# Patient Record
Sex: MALE | Race: WHITE | NOT HISPANIC OR LATINO
[De-identification: names, ages, dates, MRNs, and addresses within clinical notes are randomized per-mention and may not be internally consistent; named-entity substitution may affect disease eponyms.]

---

## 2017-03-11 ENCOUNTER — APPOINTMENT (OUTPATIENT)
Dept: RADIOLOGY | Facility: HOSPITAL | Age: 52
End: 2017-03-11

## 2017-03-11 ENCOUNTER — OUTPATIENT (OUTPATIENT)
Dept: OUTPATIENT SERVICES | Facility: HOSPITAL | Age: 52
LOS: 1 days | End: 2017-03-11
Payer: COMMERCIAL

## 2017-03-11 PROBLEM — Z00.00 ENCOUNTER FOR PREVENTIVE HEALTH EXAMINATION: Status: ACTIVE | Noted: 2017-03-11

## 2017-03-11 PROCEDURE — 72100 X-RAY EXAM L-S SPINE 2/3 VWS: CPT

## 2017-03-11 PROCEDURE — 72170 X-RAY EXAM OF PELVIS: CPT | Mod: 26

## 2017-03-11 PROCEDURE — 72170 X-RAY EXAM OF PELVIS: CPT

## 2017-03-11 PROCEDURE — 72100 X-RAY EXAM L-S SPINE 2/3 VWS: CPT | Mod: 26

## 2024-04-29 ENCOUNTER — INPATIENT (INPATIENT)
Facility: HOSPITAL | Age: 59
LOS: 4 days | Discharge: HOME | DRG: 93 | End: 2024-05-04
Attending: STUDENT IN AN ORGANIZED HEALTH CARE EDUCATION/TRAINING PROGRAM | Admitting: FAMILY MEDICINE
Payer: COMMERCIAL

## 2024-04-29 VITALS — HEIGHT: 71 IN | WEIGHT: 315 LBS

## 2024-04-29 DIAGNOSIS — R29.810 FACIAL WEAKNESS: ICD-10-CM

## 2024-04-29 LAB
ADD ON TEST-SPECIMEN IN LAB: SIGNIFICANT CHANGE UP
ALBUMIN SERPL ELPH-MCNC: 3.9 G/DL — SIGNIFICANT CHANGE UP (ref 3.3–5)
ALP SERPL-CCNC: 94 U/L — SIGNIFICANT CHANGE UP (ref 40–120)
ALT FLD-CCNC: 35 U/L — SIGNIFICANT CHANGE UP (ref 12–78)
ANION GAP SERPL CALC-SCNC: 4 MMOL/L — LOW (ref 5–17)
APTT BLD: 34.4 SEC — SIGNIFICANT CHANGE UP (ref 24.5–35.6)
AST SERPL-CCNC: 34 U/L — SIGNIFICANT CHANGE UP (ref 15–37)
BASOPHILS # BLD AUTO: 0.07 K/UL — SIGNIFICANT CHANGE UP (ref 0–0.2)
BASOPHILS NFR BLD AUTO: 0.9 % — SIGNIFICANT CHANGE UP (ref 0–2)
BILIRUB SERPL-MCNC: 0.6 MG/DL — SIGNIFICANT CHANGE UP (ref 0.2–1.2)
BUN SERPL-MCNC: 19 MG/DL — SIGNIFICANT CHANGE UP (ref 7–23)
CALCIUM SERPL-MCNC: 9.3 MG/DL — SIGNIFICANT CHANGE UP (ref 8.5–10.1)
CHLORIDE SERPL-SCNC: 106 MMOL/L — SIGNIFICANT CHANGE UP (ref 96–108)
CO2 SERPL-SCNC: 26 MMOL/L — SIGNIFICANT CHANGE UP (ref 22–31)
CREAT SERPL-MCNC: 1.17 MG/DL — SIGNIFICANT CHANGE UP (ref 0.5–1.3)
EGFR: 71 ML/MIN/1.73M2 — SIGNIFICANT CHANGE UP
EOSINOPHIL # BLD AUTO: 0.12 K/UL — SIGNIFICANT CHANGE UP (ref 0–0.5)
EOSINOPHIL NFR BLD AUTO: 1.6 % — SIGNIFICANT CHANGE UP (ref 0–6)
GLUCOSE SERPL-MCNC: 109 MG/DL — HIGH (ref 70–99)
HCT VFR BLD CALC: 43.8 % — SIGNIFICANT CHANGE UP (ref 39–50)
HGB BLD-MCNC: 14.1 G/DL — SIGNIFICANT CHANGE UP (ref 13–17)
IMM GRANULOCYTES NFR BLD AUTO: 0.3 % — SIGNIFICANT CHANGE UP (ref 0–0.9)
INR BLD: 0.99 RATIO — SIGNIFICANT CHANGE UP (ref 0.85–1.18)
LYMPHOCYTES # BLD AUTO: 1.07 K/UL — SIGNIFICANT CHANGE UP (ref 1–3.3)
LYMPHOCYTES # BLD AUTO: 14.2 % — SIGNIFICANT CHANGE UP (ref 13–44)
MCHC RBC-ENTMCNC: 28 PG — SIGNIFICANT CHANGE UP (ref 27–34)
MCHC RBC-ENTMCNC: 32.2 GM/DL — SIGNIFICANT CHANGE UP (ref 32–36)
MCV RBC AUTO: 86.9 FL — SIGNIFICANT CHANGE UP (ref 80–100)
MONOCYTES # BLD AUTO: 0.65 K/UL — SIGNIFICANT CHANGE UP (ref 0–0.9)
MONOCYTES NFR BLD AUTO: 8.7 % — SIGNIFICANT CHANGE UP (ref 2–14)
NEUTROPHILS # BLD AUTO: 5.58 K/UL — SIGNIFICANT CHANGE UP (ref 1.8–7.4)
NEUTROPHILS NFR BLD AUTO: 74.3 % — SIGNIFICANT CHANGE UP (ref 43–77)
PLATELET # BLD AUTO: 188 K/UL — SIGNIFICANT CHANGE UP (ref 150–400)
POTASSIUM SERPL-MCNC: 3.9 MMOL/L — SIGNIFICANT CHANGE UP (ref 3.5–5.3)
POTASSIUM SERPL-SCNC: 3.9 MMOL/L — SIGNIFICANT CHANGE UP (ref 3.5–5.3)
PROT SERPL-MCNC: 7.8 GM/DL — SIGNIFICANT CHANGE UP (ref 6–8.3)
PROTHROM AB SERPL-ACNC: 11.2 SEC — SIGNIFICANT CHANGE UP (ref 9.5–13)
RBC # BLD: 5.04 M/UL — SIGNIFICANT CHANGE UP (ref 4.2–5.8)
RBC # FLD: 14.6 % — HIGH (ref 10.3–14.5)
SODIUM SERPL-SCNC: 136 MMOL/L — SIGNIFICANT CHANGE UP (ref 135–145)
TROPONIN I, HIGH SENSITIVITY RESULT: 27.96 NG/L — SIGNIFICANT CHANGE UP
WBC # BLD: 7.51 K/UL — SIGNIFICANT CHANGE UP (ref 3.8–10.5)
WBC # FLD AUTO: 7.51 K/UL — SIGNIFICANT CHANGE UP (ref 3.8–10.5)

## 2024-04-29 PROCEDURE — 80307 DRUG TEST PRSMV CHEM ANLYZR: CPT

## 2024-04-29 PROCEDURE — 85025 COMPLETE CBC W/AUTO DIFF WBC: CPT

## 2024-04-29 PROCEDURE — 99285 EMERGENCY DEPT VISIT HI MDM: CPT

## 2024-04-29 PROCEDURE — 80053 COMPREHEN METABOLIC PANEL: CPT

## 2024-04-29 PROCEDURE — 99223 1ST HOSP IP/OBS HIGH 75: CPT

## 2024-04-29 PROCEDURE — 70551 MRI BRAIN STEM W/O DYE: CPT | Mod: MC

## 2024-04-29 PROCEDURE — 82962 GLUCOSE BLOOD TEST: CPT

## 2024-04-29 PROCEDURE — 84484 ASSAY OF TROPONIN QUANT: CPT

## 2024-04-29 PROCEDURE — 86618 LYME DISEASE ANTIBODY: CPT

## 2024-04-29 PROCEDURE — 70450 CT HEAD/BRAIN W/O DYE: CPT | Mod: 26,MC

## 2024-04-29 PROCEDURE — 80061 LIPID PANEL: CPT

## 2024-04-29 PROCEDURE — 87468 ANAPLSMA PHGCYTOPHLM AMP PRB: CPT

## 2024-04-29 PROCEDURE — 97162 PT EVAL MOD COMPLEX 30 MIN: CPT | Mod: GP

## 2024-04-29 PROCEDURE — 70498 CT ANGIOGRAPHY NECK: CPT | Mod: 26,MC

## 2024-04-29 PROCEDURE — 85730 THROMBOPLASTIN TIME PARTIAL: CPT

## 2024-04-29 PROCEDURE — 70496 CT ANGIOGRAPHY HEAD: CPT | Mod: 26,MC

## 2024-04-29 PROCEDURE — 83036 HEMOGLOBIN GLYCOSYLATED A1C: CPT

## 2024-04-29 PROCEDURE — 87484 EHRLICHA CHAFFEENSIS AMP PRB: CPT

## 2024-04-29 PROCEDURE — 92610 EVALUATE SWALLOWING FUNCTION: CPT | Mod: GN

## 2024-04-29 PROCEDURE — 71046 X-RAY EXAM CHEST 2 VIEWS: CPT | Mod: 26

## 2024-04-29 PROCEDURE — 85610 PROTHROMBIN TIME: CPT

## 2024-04-29 PROCEDURE — 92523 SPEECH SOUND LANG COMPREHEN: CPT | Mod: GN

## 2024-04-29 PROCEDURE — 97116 GAIT TRAINING THERAPY: CPT | Mod: GP

## 2024-04-29 PROCEDURE — 87798 DETECT AGENT NOS DNA AMP: CPT

## 2024-04-29 PROCEDURE — 93306 TTE W/DOPPLER COMPLETE: CPT

## 2024-04-29 PROCEDURE — 36415 COLL VENOUS BLD VENIPUNCTURE: CPT

## 2024-04-29 RX ORDER — LABETALOL HCL 100 MG
10 TABLET ORAL ONCE
Refills: 0 | Status: COMPLETED | OUTPATIENT
Start: 2024-04-29 | End: 2024-04-29

## 2024-04-29 RX ORDER — HYDRALAZINE HCL 50 MG
5 TABLET ORAL ONCE
Refills: 0 | Status: COMPLETED | OUTPATIENT
Start: 2024-04-29 | End: 2024-04-29

## 2024-04-29 RX ORDER — AMLODIPINE BESYLATE 2.5 MG/1
5 TABLET ORAL DAILY
Refills: 0 | Status: DISCONTINUED | OUTPATIENT
Start: 2024-04-30 | End: 2024-04-30

## 2024-04-29 RX ORDER — ASCORBIC ACID 60 MG
1 TABLET,CHEWABLE ORAL
Refills: 0 | DISCHARGE

## 2024-04-29 RX ORDER — OMEGA-3 ACID ETHYL ESTERS 1 G
1 CAPSULE ORAL
Refills: 0 | DISCHARGE

## 2024-04-29 RX ORDER — MILK THISTLE 150 MG
1 CAPSULE ORAL
Refills: 0 | DISCHARGE

## 2024-04-29 RX ORDER — HYDRALAZINE HCL 50 MG
5 TABLET ORAL ONCE
Refills: 0 | Status: DISCONTINUED | OUTPATIENT
Start: 2024-04-29 | End: 2024-04-29

## 2024-04-29 RX ORDER — ACETAMINOPHEN 500 MG
650 TABLET ORAL EVERY 6 HOURS
Refills: 0 | Status: DISCONTINUED | OUTPATIENT
Start: 2024-04-29 | End: 2024-05-04

## 2024-04-29 RX ORDER — ASCORBIC ACID 60 MG
500 TABLET,CHEWABLE ORAL DAILY
Refills: 0 | Status: DISCONTINUED | OUTPATIENT
Start: 2024-04-29 | End: 2024-05-04

## 2024-04-29 RX ORDER — ONDANSETRON 8 MG/1
4 TABLET, FILM COATED ORAL EVERY 6 HOURS
Refills: 0 | Status: DISCONTINUED | OUTPATIENT
Start: 2024-04-29 | End: 2024-04-29

## 2024-04-29 RX ORDER — ASPIRIN/CALCIUM CARB/MAGNESIUM 324 MG
325 TABLET ORAL ONCE
Refills: 0 | Status: COMPLETED | OUTPATIENT
Start: 2024-04-29 | End: 2024-04-29

## 2024-04-29 RX ORDER — OMEGA-3 ACID ETHYL ESTERS 1 G
2 CAPSULE ORAL DAILY
Refills: 0 | Status: DISCONTINUED | OUTPATIENT
Start: 2024-04-29 | End: 2024-05-04

## 2024-04-29 RX ORDER — MORPHINE SULFATE 50 MG/1
2 CAPSULE, EXTENDED RELEASE ORAL EVERY 4 HOURS
Refills: 0 | Status: DISCONTINUED | OUTPATIENT
Start: 2024-04-29 | End: 2024-04-29

## 2024-04-29 RX ADMIN — Medication 325 MILLIGRAM(S): at 15:59

## 2024-04-29 RX ADMIN — Medication 20 MILLIGRAM(S): at 21:34

## 2024-04-29 RX ADMIN — Medication 10 MILLIGRAM(S): at 19:08

## 2024-04-29 RX ADMIN — Medication 10 MILLIGRAM(S): at 20:49

## 2024-04-29 RX ADMIN — Medication 40 MILLIGRAM(S): at 15:57

## 2024-04-29 NOTE — ED ADULT TRIAGE NOTE - CHIEF COMPLAINT QUOTE
Pt coming into the ED with c/o facial droop starting at 05:30. Pt denies weakness and LOB. MD Abbott evaluated pt, no code stroke called, pt sent directly to CT. . Pt has no other complaints at this time.

## 2024-04-29 NOTE — ED ADULT NURSE NOTE - OBJECTIVE STATEMENT
pt presenting w/right sided facial droop since he woke this morning around 530 am. pt states his eye is also swollen. evaled by MD, pnd CT read, lined and labbed. NIKKI w/o issue

## 2024-04-29 NOTE — ED PROVIDER NOTE - CLINICAL SUMMARY MEDICAL DECISION MAKING FREE TEXT BOX
Patient is a 58-year-old male who reports he awoke with inability to close right eye inability to elevate right eyebrow and right-sided facial droop.  Patient denies any recent illness.  Patient denies any difficulty with speech weakness in arms and legs.  Fever diagnosis of Bell's palsy on examination.  Due to patient's age will order CT noncontrast head and basic labs including Lyme and reeval closely

## 2024-04-29 NOTE — PHARMACOTHERAPY INTERVENTION NOTE - COMMENTS
Medication reconciliation completed.  Reviewed Medication list and confirmed med allergies with patient; confirmed with Dr. First Medlizett.

## 2024-04-29 NOTE — ED ADULT NURSE REASSESSMENT NOTE - NS ED NURSE REASSESS COMMENT FT1
Received report from BORIS PHILLIPS. Pt A&Ox4, resting comfortably. Pt denies any pain or discomfort. VS as charted, respirations equal and unlabored. Pending bed placement. Pt updated on plan of care, verbalized understanding. Safety measures maintained, stretcher locked and in lowest position, both side rails up.

## 2024-04-29 NOTE — ED PROVIDER NOTE - PROGRESS NOTE DETAILS
Mykel Tenorio for Dr. Abbott: Clinical exam suggestive of Bell's palsy, although patient with bifascicular block with no old EKG and uncontrolled HTN at this time and obesity. Will admit for further cardiac and neuro workup at this time. I, TONNY Richmond personally discussed the case with consultant, Dr. Piper attending hospitalist, about pt who has facial droop and old infarcts on CT head and will need hospital admission."

## 2024-04-29 NOTE — ED PROVIDER NOTE - OBJECTIVE STATEMENT
Patient is a 58-year-old male with no past medical history.  Patient reports that he was just at doctor for routine physical.  patient reports that he went to sleep between 10 and 11:00 last night.  Patient reports that he awoke between 6 and 7 AM this morning.   Patient denies any difficulties with speech.  Patient denies any weakness in arms or legs.  Patient reports that he is unable to close right eye fully also with droop to right side of his lips.  Patient came to the hospital for evaluation.  Patient denies any history of hypertension, diabetes, heart disease, stroke.

## 2024-04-29 NOTE — ED PROVIDER NOTE - NEUROLOGICAL, MLM
Alert and oriented, unable to close right eye fully; unable to wrinkle right side of forehead (asymmetry) and right facial droop

## 2024-04-29 NOTE — H&P ADULT - SKIN
warm and dry/color normal/normal/no rashes/no ulcers Detail Level: Detailed Quality 110: Preventive Care And Screening: Influenza Immunization: Influenza Immunization previously received during influenza season

## 2024-04-29 NOTE — H&P ADULT - ASSESSMENT
59 y/o M w/ no significant PMH, p/w R facial droop    *R facial droop - Suspect [place] Palsy  -Clinical findings more indicative of [place] palsy, rather than CVA. However, will consult Neuro and will defer to neuro regarding work up for CVA  -CT:  -MRI   -Will start prednisone for possible bells palsy  -Lyme panel   -ID consult to evaluate for recommendation to possbily add Antiviral as well   -Artifical eye drops     *Bifasciular block on EKG  -Old EKG not available for comparison  -Cardio consult     *DVT ppx   -SCDs     >75 mins required for admission  59 y/o M w/ no significant PMH, p/w R facial droop    *R facial droop - Suspect [place] Palsy  -Clinical findings more indicative of [place] palsy, rather than CVA. However, will consult Neuro and will defer to neuro regarding work up for CVA  -CT: No acute findings   -MRI   -Will start prednisone for possible bells palsy  -Lyme panel   -ID consult to evaluate for recommendation to possbily add Antiviral as well   -Artifical eye drops     *Bifasciular block on EKG  -Old EKG not available for comparison  -Cardio consult     *DVT ppx   -SCDs     >75 mins required for admission    59 y/o M w/ no significant PMH, p/w R facial droop    *R facial droop - Suspect [place] Palsy  -Clinical findings more indicative of [place] palsy, rather than CVA. However, will consult Neuro and will defer to neuro regarding work up for CVA  -CTH: No acute findings  -MRI   -Will start prednisone for possible bells palsy  -Lyme panel   -ID consult to evaluate for recommendation to possbily add Antiviral as well   -Artifical eye drops     *Bifasciular block on EKG  -Old EKG not available for comparison  -Cardio consult     *DVT ppx   -SCDs     >75 mins required for admission  59 y/o M w/ no significant PMH, p/w R facial droop    *R facial droop - Suspect [place] Palsy  -Clinical findings more indicative of [place] palsy, rather than CVA. However, will consult Neuro and will defer to neuro regarding work up for CVA  -CTH: No acute findings  -MRI   -Will start prednisone for possible bells palsy  -Lyme panel   -ID consult to evaluate for recommendation to possibly add Antiviral as well   -Artifical eye drops     *Bifasciular block on EKG  -Old EKG not available for comparison  -Cardio consult     *DVT ppx   -SCDs     >75 mins required for admission    57 y/o M w/ no significant PMH, p/w R facial droop    *R facial droop - Suspect [place] Palsy  -Clinical findings more indicative of [place] palsy, rather than CVA. However, will consult Neuro and will defer to neuro regarding work up for CVA  -CTH: No acute findings  -MRI   -Will start prednisone for possible bells palsy  -Lyme panel   -ID consult to evaluate for recommendation to possibly add Antiviral as well   -Artifical eye drops     *Bifasciular block on EKG  -Old EKG not available for comparison  -Cardio consult     *DVT ppx   -SCDs     >75 mins required for admission    57 y/o M w/ no significant PMH, p/w R facial droop    *R facial droop - Suspect [place] Palsy  -Clinical findings more indicative of [place] palsy, rather than CVA. However, will consult Neuro and will defer to neuro regarding work up for CVA  -CTH: No acute findings  -MRI   -Will start prednisone for possible bells palsy  -Lyme panel   -ID consult to evaluate for recommendation to possibly add Antiviral as well   -Artifical eye drops     *Bifasciular block on EKG  -Old EKG not available for comparison  -Cardio consult     *Hypertensive urgency  -S/p labetaolol in ED  -Will give Hydralazine IV stat  -Will start norvasc 5mg PO daily and adjust as needed   -Lifestyle modifications    *DVT ppx   -SCDs     >75 mins required for admission

## 2024-04-29 NOTE — ED ADULT NURSE REASSESSMENT NOTE - NS ED NURSE REASSESS COMMENT FT1
Pt with Dr. Queen regarding pt's BP. Will medicate as per orders in MAR. Dr. Queen stated pt is ok to go to floor as of now.

## 2024-04-29 NOTE — H&P ADULT - HISTORY OF PRESENT ILLNESS
57 y/o M w/ no significant PMH, p/w R facial droop. Patient states he woke up around 5:30am and went to drink water when he realized that he had R sided facial droop. Patient states he had COVID approximately 6 weeks ago. Denies any other recent infection, rash, vesicles in general areas or cold sores. Denies weakness in arms / legs. Denies CP, SOB, cough, runny nose, sore throat, nausea, vomiting, abdominal pain, fever, chills       PSH: Denies     Social Hx: Former smoker - quit in 2022,  etoh - 1-2 drinks on weekend, drugs - denies      Family Hx: Mother - HTN, father - esophageal cancer

## 2024-04-29 NOTE — ED PROVIDER NOTE - ATTENDING APP SHARED VISIT CONTRIBUTION OF CARE
I, Danyelle Abbott DO,  performed the initial face to face bedside interview with this patient regarding history of present illness, review of symptoms and relevant past medical, social and family history.  I completed an independent physical examination.  I was the initial provider who evaluated this patient.   I personally saw the patient and performed a substantive portion of the visit including all aspects of the medical decision making.  I have signed out the follow up of any pending tests (i.e. labs, radiological studies) to the ACP.  I have communicated the patient’s plan of care and disposition with the ACP.  The history, relevant review of systems, past medical and surgical history, medical decision making, and physical examination was documented by the scribe in my presence and I attest to the accuracy of the documentation.

## 2024-04-30 ENCOUNTER — RESULT REVIEW (OUTPATIENT)
Age: 59
End: 2024-04-30

## 2024-04-30 DIAGNOSIS — I10 ESSENTIAL (PRIMARY) HYPERTENSION: ICD-10-CM

## 2024-04-30 DIAGNOSIS — R94.31 ABNORMAL ELECTROCARDIOGRAM [ECG] [EKG]: ICD-10-CM

## 2024-04-30 DIAGNOSIS — E78.5 HYPERLIPIDEMIA, UNSPECIFIED: ICD-10-CM

## 2024-04-30 DIAGNOSIS — E66.01 MORBID (SEVERE) OBESITY DUE TO EXCESS CALORIES: ICD-10-CM

## 2024-04-30 LAB
A1C WITH ESTIMATED AVERAGE GLUCOSE RESULT: 6.1 % — HIGH (ref 4–5.6)
ALBUMIN SERPL ELPH-MCNC: 3.6 G/DL — SIGNIFICANT CHANGE UP (ref 3.3–5)
ALP SERPL-CCNC: 82 U/L — SIGNIFICANT CHANGE UP (ref 40–120)
ALT FLD-CCNC: 32 U/L — SIGNIFICANT CHANGE UP (ref 12–78)
AMPHET UR-MCNC: NEGATIVE — SIGNIFICANT CHANGE UP
ANION GAP SERPL CALC-SCNC: 4 MMOL/L — LOW (ref 5–17)
APTT BLD: 29.9 SEC — SIGNIFICANT CHANGE UP (ref 24.5–35.6)
AST SERPL-CCNC: 23 U/L — SIGNIFICANT CHANGE UP (ref 15–37)
B BURGDOR C6 AB SER-ACNC: NEGATIVE — SIGNIFICANT CHANGE UP
B BURGDOR IGG+IGM SER-ACNC: 0.1 INDEX — SIGNIFICANT CHANGE UP (ref 0.01–0.89)
BARBITURATES UR SCN-MCNC: NEGATIVE — SIGNIFICANT CHANGE UP
BASOPHILS # BLD AUTO: 0.02 K/UL — SIGNIFICANT CHANGE UP (ref 0–0.2)
BASOPHILS NFR BLD AUTO: 0.2 % — SIGNIFICANT CHANGE UP (ref 0–2)
BENZODIAZ UR-MCNC: NEGATIVE — SIGNIFICANT CHANGE UP
BILIRUB SERPL-MCNC: 0.4 MG/DL — SIGNIFICANT CHANGE UP (ref 0.2–1.2)
BUN SERPL-MCNC: 16 MG/DL — SIGNIFICANT CHANGE UP (ref 7–23)
CALCIUM SERPL-MCNC: 9.4 MG/DL — SIGNIFICANT CHANGE UP (ref 8.5–10.1)
CHLORIDE SERPL-SCNC: 108 MMOL/L — SIGNIFICANT CHANGE UP (ref 96–108)
CHOLEST SERPL-MCNC: 178 MG/DL — SIGNIFICANT CHANGE UP
CO2 SERPL-SCNC: 24 MMOL/L — SIGNIFICANT CHANGE UP (ref 22–31)
COCAINE METAB.OTHER UR-MCNC: NEGATIVE — SIGNIFICANT CHANGE UP
CREAT SERPL-MCNC: 1.25 MG/DL — SIGNIFICANT CHANGE UP (ref 0.5–1.3)
EGFR: 66 ML/MIN/1.73M2 — SIGNIFICANT CHANGE UP
EOSINOPHIL # BLD AUTO: 0 K/UL — SIGNIFICANT CHANGE UP (ref 0–0.5)
EOSINOPHIL NFR BLD AUTO: 0 % — SIGNIFICANT CHANGE UP (ref 0–6)
ESTIMATED AVERAGE GLUCOSE: 128 MG/DL — HIGH (ref 68–114)
GLUCOSE BLDC GLUCOMTR-MCNC: 100 MG/DL — HIGH (ref 70–99)
GLUCOSE SERPL-MCNC: 152 MG/DL — HIGH (ref 70–99)
HCT VFR BLD CALC: 42.2 % — SIGNIFICANT CHANGE UP (ref 39–50)
HDLC SERPL-MCNC: 41 MG/DL — SIGNIFICANT CHANGE UP
HGB BLD-MCNC: 13.7 G/DL — SIGNIFICANT CHANGE UP (ref 13–17)
IMM GRANULOCYTES NFR BLD AUTO: 0.4 % — SIGNIFICANT CHANGE UP (ref 0–0.9)
INR BLD: 1.01 RATIO — SIGNIFICANT CHANGE UP (ref 0.85–1.18)
LIPID PNL WITH DIRECT LDL SERPL: 129 MG/DL — HIGH
LYMPHOCYTES # BLD AUTO: 0.91 K/UL — LOW (ref 1–3.3)
LYMPHOCYTES # BLD AUTO: 9.8 % — LOW (ref 13–44)
MCHC RBC-ENTMCNC: 27.8 PG — SIGNIFICANT CHANGE UP (ref 27–34)
MCHC RBC-ENTMCNC: 32.5 GM/DL — SIGNIFICANT CHANGE UP (ref 32–36)
MCV RBC AUTO: 85.8 FL — SIGNIFICANT CHANGE UP (ref 80–100)
METHADONE UR-MCNC: NEGATIVE — SIGNIFICANT CHANGE UP
MONOCYTES # BLD AUTO: 0.48 K/UL — SIGNIFICANT CHANGE UP (ref 0–0.9)
MONOCYTES NFR BLD AUTO: 5.1 % — SIGNIFICANT CHANGE UP (ref 2–14)
NEUTROPHILS # BLD AUTO: 7.88 K/UL — HIGH (ref 1.8–7.4)
NEUTROPHILS NFR BLD AUTO: 84.5 % — HIGH (ref 43–77)
NON HDL CHOLESTEROL: 137 MG/DL — HIGH
OPIATES UR-MCNC: NEGATIVE — SIGNIFICANT CHANGE UP
PCP SPEC-MCNC: SIGNIFICANT CHANGE UP
PCP UR-MCNC: NEGATIVE — SIGNIFICANT CHANGE UP
PLATELET # BLD AUTO: 209 K/UL — SIGNIFICANT CHANGE UP (ref 150–400)
POTASSIUM SERPL-MCNC: 4.1 MMOL/L — SIGNIFICANT CHANGE UP (ref 3.5–5.3)
POTASSIUM SERPL-SCNC: 4.1 MMOL/L — SIGNIFICANT CHANGE UP (ref 3.5–5.3)
PROT SERPL-MCNC: 7.3 GM/DL — SIGNIFICANT CHANGE UP (ref 6–8.3)
PROTHROM AB SERPL-ACNC: 11.4 SEC — SIGNIFICANT CHANGE UP (ref 9.5–13)
RBC # BLD: 4.92 M/UL — SIGNIFICANT CHANGE UP (ref 4.2–5.8)
RBC # FLD: 14.7 % — HIGH (ref 10.3–14.5)
SODIUM SERPL-SCNC: 136 MMOL/L — SIGNIFICANT CHANGE UP (ref 135–145)
THC UR QL: NEGATIVE — SIGNIFICANT CHANGE UP
TRIGL SERPL-MCNC: 43 MG/DL — SIGNIFICANT CHANGE UP
WBC # BLD: 9.33 K/UL — SIGNIFICANT CHANGE UP (ref 3.8–10.5)
WBC # FLD AUTO: 9.33 K/UL — SIGNIFICANT CHANGE UP (ref 3.8–10.5)

## 2024-04-30 PROCEDURE — 70551 MRI BRAIN STEM W/O DYE: CPT | Mod: 26

## 2024-04-30 PROCEDURE — 99223 1ST HOSP IP/OBS HIGH 75: CPT

## 2024-04-30 PROCEDURE — 93306 TTE W/DOPPLER COMPLETE: CPT | Mod: 26

## 2024-04-30 PROCEDURE — 99233 SBSQ HOSP IP/OBS HIGH 50: CPT

## 2024-04-30 RX ORDER — ASPIRIN/CALCIUM CARB/MAGNESIUM 324 MG
81 TABLET ORAL DAILY
Refills: 0 | Status: DISCONTINUED | OUTPATIENT
Start: 2024-04-30 | End: 2024-05-04

## 2024-04-30 RX ORDER — VALACYCLOVIR 500 MG/1
500 TABLET, FILM COATED ORAL EVERY 12 HOURS
Refills: 0 | Status: DISCONTINUED | OUTPATIENT
Start: 2024-04-30 | End: 2024-05-04

## 2024-04-30 RX ORDER — LOSARTAN POTASSIUM 100 MG/1
50 TABLET, FILM COATED ORAL DAILY
Refills: 0 | Status: DISCONTINUED | OUTPATIENT
Start: 2024-05-01 | End: 2024-05-02

## 2024-04-30 RX ORDER — HYDRALAZINE HCL 50 MG
5 TABLET ORAL ONCE
Refills: 0 | Status: COMPLETED | OUTPATIENT
Start: 2024-04-30 | End: 2024-04-30

## 2024-04-30 RX ORDER — AMLODIPINE BESYLATE 2.5 MG/1
10 TABLET ORAL DAILY
Refills: 0 | Status: DISCONTINUED | OUTPATIENT
Start: 2024-05-01 | End: 2024-05-04

## 2024-04-30 RX ORDER — CLOPIDOGREL BISULFATE 75 MG/1
75 TABLET, FILM COATED ORAL DAILY
Refills: 0 | Status: DISCONTINUED | OUTPATIENT
Start: 2024-04-30 | End: 2024-05-04

## 2024-04-30 RX ORDER — HYDRALAZINE HCL 50 MG
10 TABLET ORAL EVERY 6 HOURS
Refills: 0 | Status: DISCONTINUED | OUTPATIENT
Start: 2024-04-30 | End: 2024-05-04

## 2024-04-30 RX ORDER — ENOXAPARIN SODIUM 100 MG/ML
40 INJECTION SUBCUTANEOUS EVERY 24 HOURS
Refills: 0 | Status: DISCONTINUED | OUTPATIENT
Start: 2024-04-30 | End: 2024-05-04

## 2024-04-30 RX ORDER — ATORVASTATIN CALCIUM 80 MG/1
40 TABLET, FILM COATED ORAL AT BEDTIME
Refills: 0 | Status: DISCONTINUED | OUTPATIENT
Start: 2024-04-30 | End: 2024-05-04

## 2024-04-30 RX ORDER — AMLODIPINE BESYLATE 2.5 MG/1
5 TABLET ORAL ONCE
Refills: 0 | Status: COMPLETED | OUTPATIENT
Start: 2024-04-30 | End: 2024-04-30

## 2024-04-30 RX ORDER — PANTOPRAZOLE SODIUM 20 MG/1
40 TABLET, DELAYED RELEASE ORAL
Refills: 0 | Status: DISCONTINUED | OUTPATIENT
Start: 2024-04-30 | End: 2024-05-04

## 2024-04-30 RX ORDER — LOSARTAN POTASSIUM 100 MG/1
25 TABLET, FILM COATED ORAL DAILY
Refills: 0 | Status: DISCONTINUED | OUTPATIENT
Start: 2024-04-30 | End: 2024-04-30

## 2024-04-30 RX ORDER — LOSARTAN POTASSIUM 100 MG/1
25 TABLET, FILM COATED ORAL ONCE
Refills: 0 | Status: COMPLETED | OUTPATIENT
Start: 2024-04-30 | End: 2024-04-30

## 2024-04-30 RX ADMIN — LOSARTAN POTASSIUM 25 MILLIGRAM(S): 100 TABLET, FILM COATED ORAL at 11:35

## 2024-04-30 RX ADMIN — ATORVASTATIN CALCIUM 40 MILLIGRAM(S): 80 TABLET, FILM COATED ORAL at 21:13

## 2024-04-30 RX ADMIN — Medication 5 MILLIGRAM(S): at 00:17

## 2024-04-30 RX ADMIN — Medication 1 DROP(S): at 22:41

## 2024-04-30 RX ADMIN — Medication 5 MILLIGRAM(S): at 03:57

## 2024-04-30 RX ADMIN — VALACYCLOVIR 500 MILLIGRAM(S): 500 TABLET, FILM COATED ORAL at 21:13

## 2024-04-30 RX ADMIN — PANTOPRAZOLE SODIUM 40 MILLIGRAM(S): 20 TABLET, DELAYED RELEASE ORAL at 18:37

## 2024-04-30 RX ADMIN — Medication 2 GRAM(S): at 08:02

## 2024-04-30 RX ADMIN — Medication 1 TABLET(S): at 08:06

## 2024-04-30 RX ADMIN — Medication 500 MILLIGRAM(S): at 08:06

## 2024-04-30 RX ADMIN — CLOPIDOGREL BISULFATE 75 MILLIGRAM(S): 75 TABLET, FILM COATED ORAL at 18:38

## 2024-04-30 RX ADMIN — ENOXAPARIN SODIUM 40 MILLIGRAM(S): 100 INJECTION SUBCUTANEOUS at 18:38

## 2024-04-30 RX ADMIN — Medication 81 MILLIGRAM(S): at 18:38

## 2024-04-30 RX ADMIN — LOSARTAN POTASSIUM 25 MILLIGRAM(S): 100 TABLET, FILM COATED ORAL at 14:54

## 2024-04-30 RX ADMIN — Medication 10 MILLIGRAM(S): at 20:53

## 2024-04-30 RX ADMIN — AMLODIPINE BESYLATE 5 MILLIGRAM(S): 2.5 TABLET ORAL at 14:53

## 2024-04-30 RX ADMIN — Medication 10 MILLIGRAM(S): at 13:55

## 2024-04-30 RX ADMIN — Medication 60 MILLIGRAM(S): at 08:02

## 2024-04-30 RX ADMIN — AMLODIPINE BESYLATE 5 MILLIGRAM(S): 2.5 TABLET ORAL at 08:06

## 2024-04-30 NOTE — CONSULT NOTE ADULT - PROBLEM SELECTOR RECOMMENDATION 2
Left axis  LVH Right bundle branch block  likely hypertensive heart disease , will obtain echo to assess ventricular function ,   spoke to primary , patient similar ekg last friday at his office ,  ( patient did not see any physician from 2010 until last friday )     will OP ischemic work up

## 2024-04-30 NOTE — SWALLOW BEDSIDE ASSESSMENT ADULT - ADDITIONAL RECOMMENDATIONS
PT WITH BELL'S PALSY AND IS BEING TREATED WITH STEROIDS/ANTI VIRAL MEDICATION. PT WAS ADVISED THAT IF FACIAL MOTOR RESTORATION DOES NOT OCCUR FOLLOWING MEDICINAL TREATMENT, HE CAN CONSIDER FACIAL REANIMATION THERAPY WITH EITHER AND OT OR SPEECH PATHOLOGIST AS AN OUTPATIENT AFTER D/C FROM HOSPITAL. PT VERBALIZED AN UNDERSTANDING OF THE ABOVE.

## 2024-04-30 NOTE — CONSULT NOTE ADULT - SUBJECTIVE AND OBJECTIVE BOX
CHIEF COMPLAINT: right fascial droop     HPI:  57 y/o M w/ no significant PMH, p/w R facial droop. Patient states he woke up around 5:30am and went to drink water when he realized that he had R sided facial droop. Patient states he had COVID approximately 6 weeks ago. Denies any other recent infection, rash, vesicles in general areas or cold sores. Denies weakness in arms / legs. Denies CP, SOB, cough, runny nose, sore throat, nausea, vomiting, abdominal pain, fever, chills   called for cardiology evaluation as patient was noted to have abnormal ekg , Patient says he did not see any physician since 2010 , until last friday   he saw primary care for first time , patient is morbidly obese ,  denies any chest pain or shortness of breath , does exercise on stationary bike ,   his blood pressure was markedly elevated in ER  persistently elevated     patient not checking his BP at home         PAST MEDICAL & SURGICAL HISTORY:   none     Allergies    No Known Allergies    Intolerances        SOCIAL HISTORY:  Former smoker - quit in 2022,  etoh - 1-2 drinks on weekend, drugs - denies          FAMILY HISTORY:    Mother - HTN, father - esophageal cancer who was smoker     MEDICATIONS:  MEDICATIONS  (STANDING):  amLODIPine   Tablet 5 milliGRAM(s) Oral daily  ascorbic acid 500 milliGRAM(s) Oral daily  losartan 25 milliGRAM(s) Oral daily  multivitamin 1 Tablet(s) Oral daily  omega-3-Acid Ethyl Esters 2 Gram(s) Oral daily  predniSONE   Tablet 60 milliGRAM(s) Oral daily  valACYclovir 500 milliGRAM(s) Oral every 12 hours    MEDICATIONS  (PRN):  acetaminophen     Tablet .. 650 milliGRAM(s) Oral every 6 hours PRN Mild Pain (1 - 3)  artificial tears (preservative free) Ophthalmic Solution 1 Drop(s) Right EYE four times a day PRN Dry Eyes  hydrALAZINE Injectable 10 milliGRAM(s) IV Push every 6 hours PRN For SBP more than 160      REVIEW OF SYSTEMS:    CONSTITUTIONAL: No weakness, fevers or chills  EYES/ENT: No visual changes;  No vertigo or throat pain   NECK: No pain or stiffness  RESPIRATORY: No cough, wheezing, hemoptysis; No shortness of breath  CARDIOVASCULAR: No chest pain or palpitations  GASTROINTESTINAL: No abdominal or epigastric pain. No nausea, vomiting, or hematemesis; No diarrhea or constipation. No melena or hematochezia.  GENITOURINARY: No dysuria, frequency or hematuria  NEUROLOGICAL: right facial palsy  No numbness or weakness  SKIN: No itching, burning, rashes, or lesions   All other review of systems is negative unless indicated above    Vital Signs Last 24 Hrs  T(C): 36.7 (30 Apr 2024 08:05), Max: 36.8 (29 Apr 2024 14:22)  T(F): 98 (30 Apr 2024 08:05), Max: 98.3 (29 Apr 2024 14:22)  HR: 90 (30 Apr 2024 11:05) (70 - 134)  BP: 208/102 (30 Apr 2024 11:05) (175/97 - 209/111)  BP(mean): 127 (30 Apr 2024 01:10) (125 - 130)  RR: 16 (30 Apr 2024 11:00) (16 - 18)  SpO2: 97% (30 Apr 2024 11:00) (96% - 99%)    Parameters below as of 30 Apr 2024 11:00  Patient On (Oxygen Delivery Method): room air        I&O's Summary      PHYSICAL EXAM:    Constitutional: NAD, awake and alert, well-developed  HEENT: PERR, EOMI,  No oral cyananosis.  Neck:  supple,  No JVD  Respiratory: Breath sounds are clear bilaterally, No wheezing, rales or rhonchi  Cardiovascular: S1 and S2, regular rate and rhythm, no Murmurs, gallops or rubs  Gastrointestinal: Bowel Sounds present, soft, nontender.   Extremities: No peripheral edema. No clubbing or cyanosis.  Vascular: 2+ peripheral pulses  Neurological: A/O x 3, right facial palsy other wise no other  focal deficits  Musculoskeletal: no calf tenderness.  Skin: No rashes.      LABS: All Labs Reviewed:                        13.7   9.33  )-----------( 209      ( 30 Apr 2024 08:20 )             42.2                         14.1   7.51  )-----------( 188      ( 29 Apr 2024 13:56 )             43.8     30 Apr 2024 08:20    136    |  108    |  16     ----------------------------<  152    4.1     |  24     |  1.25   29 Apr 2024 13:56    136    |  106    |  19     ----------------------------<  109    3.9     |  26     |  1.17     Ca    9.4        30 Apr 2024 08:20  Ca    9.3        29 Apr 2024 13:56    TPro  7.3    /  Alb  3.6    /  TBili  0.4    /  DBili  x      /  AST  23     /  ALT  32     /  AlkPhos  82     30 Apr 2024 08:20  TPro  7.8    /  Alb  3.9    /  TBili  0.6    /  DBili  x      /  AST  34     /  ALT  35     /  AlkPhos  94     29 Apr 2024 13:56    PT/INR - ( 30 Apr 2024 08:20 )   PT: 11.4 sec;   INR: 1.01 ratio         PTT - ( 30 Apr 2024 08:20 )  PTT:29.9 sec    04-30 Chol 178 LDL -- HDL 41 Trig 43    Blood Culture:     - TroponinI hsT: <-27.96    RADIOLOGY/EKG:< from: 12 Lead ECG (04.29.24 @ 14:22) >    Diagnosis Line Normal sinus rhythm  Right bundle branch block  Left anterior fascicular block  *** Bifascicular block ***  Moderate voltage criteria for LVH, may be normal variant  Abnormal ECG  No previous ECGs available  Confirmed by TICO WASHINGTON PAUL ([id number]) on 4/29/2024 3:46:09 PM    < end of copied text >

## 2024-04-30 NOTE — SWALLOW BEDSIDE ASSESSMENT ADULT - SWALLOW EVAL: DIAGNOSIS
1) Pt w/right facial palsy in setting of [place] Palsy. Pt able to spontaneously compensate for the above by presenting PO to left side of oral cavity. On exam, the pt demonstrates grossly functional Oropharyngeal Swallowing integrity for age, despite Ruiz's Palsy sequel. NO behavioral aspiration signs exhibited. No change in O2 sats noted. Odynophagia was denied. 1) Pt w/right facial paresis in setting of [place] Palsy. Pt able to spontaneously compensate for the above by presenting PO to left side of oral cavity. On exam, the pt demonstrates grossly functional Oropharyngeal Swallowing integrity for age, despite Ruiz's Palsy sequel. NO behavioral aspiration signs exhibited. No change in O2 sats noted. Odynophagia was denied.

## 2024-04-30 NOTE — PHYSICAL THERAPY INITIAL EVALUATION ADULT - LEVEL OF INDEPENDENCE: GAIT, REHAB EVAL
Physical Therapy Evaluation    Referred by: Ventura Calabrese DO; Medical Diagnosis (from order):    Diagnosis Information      Diagnosis    724.2 (ICD-9-CM) - M54.50 (ICD-10-CM) - Acute right-sided low back pain, unspecified whether sciatica present              Visit: 1    Visit Type: Initial Evaluation  Treatment Diagnosis: lumbar, right: hip symptoms with increased pain/symptoms, impaired body mechanics, impaired activity tolerance, impaired gait, impaired strengthDate of exacerbation: 3-weeks ago    Yellow Flags: depression and anxiety  Patient alert and oriented X3.  Chart reviewed at time of initial evaluation (relevant co-morbidities, allergies, tests and medications listed): PMH  -Anxiety, depression  -History of x3 foot stress fractures  -Osteoperosis    PSH  -Right groin hernia repair 2000  -Appendectomy 2021  -Tonsillectomy 2021  -Hysterectomy 2021  -Gallbladder removal 2021    Rx:  -lidocaine (LIDODERM) 5 % patch  -citalopram (CeleXA) 40 MG tablet  -buPROPion XL (WELLBUTRIN XL) 150 MG 24 hr tablet    Imaging (7/26/22)  -Lumbar: L4-L5 mild anterior subluxation; L5-S1 mild posterior subluxation; L4-L5, L5-S1 disc space narrowing; DDD  -Hip: No osseous abnormalities noted.  Diagnostic Tests: X-ray: reviewed.      SUBJECTIVE                                                                                                               Patient reports an onset of acute low back pain 3-weeks ago while bending forward (with right low back twist) to  a planter. Pain has diminished since then to about a 2-3/10 while walking >20 minutes; however can intensify more if attempting to  objects weighing >15lbs. Pain is localized to the right lumbar and right QL region. Denies radicular symptoms. Irritability with transitions (sit>stand, supine>stand), walking long distances >20 min, extension based patterns, sitting for long periods of time. Easing factors are supine resting, standing. Patient does also report  some numbness and tingling down the right arm following some work in the garden last week (ulnar distribution). 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 Denies any bowel/bladder changes, dizziness, nausea/vomitting, or other red flags that may warrant referral.  Pain / Symptoms:  Pain rating (out of 10): Current: 0 ; Best: 0; Worst: 3 (can intensify past 3 with lifting)  Location: Right lumbar/QL region      Quality / Description: sharp. Stabbing  Alleviating Factors: change in position, ice, rest, topical agents/patch.   Progression since onset: improved  Function:   Limitations / Exacerbation Factors: pain, difficulty, sleep disturbed,  activities, house/yard work, sitting tasks, kneeling, bending/squatting/lifting, lifting/carrying, squatting/lifting and sitting, positional transitions, community distances  Prior Level of Function: pain free ADLs and IADLs,  Patient Goals: decreased pain, increased strength and return to sport/leisure activities    Prior treatment: no therapies  Discharged from hospital, home health, or skilled nursing facility in last 30 days: no    Home Environment:   Patient lives with significant other  Patient lives at: standard home.  Assistance available: as needed  Feels safe at home/work/school.  2 or more falls or an unexplained fall with injury in the last year:  No    OBJECTIVE                                                                                                                  Outcome Measures:   OSWESTRY Total Scored: 15  OSWESTRY Total Possible Score: 50  OSWESTRY Score Calculated: 30 %    (0-20% = minimal disability; 20-40% = moderate disability; 40-60% = severe disability; 60-80% = crippled; % = bed bound) see flowsheet for additional documentation     Observation:   Spine: increased lumbar lordosis  Seated Posture: good  Standing Posture: good  Observed Gait:   Weight bearing: Left: full  Right: full    Analysis: drifts  left;    • Left: trendelenburg, forward flexed hip and decreased stride length    • Right: trendelenburg, forward flexed hip and decreased stride length    Range of Motion (ROM)   (degrees unless noted; active unless noted; norms in ( ); negative=lacking to 0, positive=beyond 0)   WNL: left hip, right hip, left knee, right knee  Lumbar:    - Flexion (60-80):  WNL (fingertips to ankles)     - Extension (25):  WNL (no pain; however did give pain before)     - Rotation (30-45):        • Left:  WNL         • Right:  WNL     - Side Bend (25-35):        • Left:  WNL (fingertips to top of knee)         • Right:  WNL (fingertips to top of knee)   Details / Comments: Right hip external and internal rotation WNL however end-range is more stiff versus left hip internal/external rotation (no pain).     Strength  (out of 5 unless noted, standard test position unless noted, lbs tested with hand held dynamometer)   Hip:    - Flexion:        • Left: 4+        • Right: 4    - Extension:        • Left: 4        • Right: 4-    - Abduction:        • Left: 4        • Right: 4-    - Internal Rotation:        • Left: 4-        • Right: 4-    - External Rotation:        • Left: 4-        • Right: 4-      Palpation:   Right Lower Extremity: Lumbar Paraspinals: hypertonic; Quadratus Lumborum: hypertonic; Piriformis: hypertonic;     Special Tests:  Straight Leg Raise:     Passive supine: Left: negative Right: negative      Double Leg Squat:     Comments:  Patient does not move through the hips effectively during double legged squat which results in early forward torso/lumbar bending. More load through the knees and forefoot at bottom position (about 45 degrees).  Vitals:  BP: 156/89  HR: 66  SpO2: 97%    *Addendum to movement of vitals information from subjective paragraph to vitals section of observation. NIESHA 8/16/22 at 9:46am.  TREATMENT                                                                                                                 initial evaluation completed    Therapeutic Exercise:  See ROM and Strength tests above.    Supine:  -Posterior pelvic tilt: 1x10x5' hold  -Posterior pelvic tilt with unilateral push/pull: 1x5x5' hold each side  -Posterior pelvic tilt with bilateral push/pull: 1x5x5' hold (challenging)  -Hip bridge: 1x10x5' hold  -Figure-4 external rotation mobilization: 2x4x15' holds    Standing:  -Tennis ball stm at wall (localized to piriformis and right upper hip region) - x2 minutes    Patient educated on use of tennis ball + supine figure 4 mobilization for low back relief and performance of pelvic tilts, planks, bridges for hip strengthening to improve ability to perform functional tasks.    Manual Therapy:  Lumbar AP assessment + paraspinal assessment as noted above.    Skilled input: verbal instruction/cues and tactile instruction/cues    Writer verbally educated and received verbal consent for hand placement, positioning of patient, and techniques to be performed today from patient for clothing adjustments for techniques, therapist position for techniques and hand placement and palpation for techniques as described above and how they are pertinent to the patient's plan of care.    Home Exercise Program/Education Materials: Access Code: [email or web address]  URL: [email or web address]  Date: 08/16/2022  Prepared by: Erick Lewis    Exercises  · Supine Bridge - 1 x daily - 5 x weekly - 3 sets - 10 reps - 5 hold  · Supine Posterior Pelvic Tilt - 1 x daily - 5 x weekly - 3 sets - 5 reps - 5 hold  · Plank with Elbows on Table - 1 x daily - 5 x weekly - 3 sets - 4 reps - 15 hold  · Supine Figure 4 Piriformis Stretch - 1 x daily - 5 x weekly - 3 sets - 4 reps - 15 hold     ASSESSMENT                                                                                                           66 year old female patient has reported functional limitations listed above impacted by signs and symptoms consistent with  below   • Involved: lumbar      - right hip   • Symptoms/impairments: increased pain/symptoms, impaired body mechanics, impaired activity tolerance, impaired gait and impaired strength  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 Patient displays signs and symptoms of a right lumbar paraspinal/QL strain with concomitant irritation of the right piriformis. Patient is able to complete core stabilization activities, muscle length activities, and soft tissue mobilization without aggravation to the area yet can localize the region of discomfort. Patient does present with normal, functional motion of the back and hips that do not reproduce pain; however patient does have deficits in global hip strength (right>left) which could continue to result in exacerbation of lumbar back pain with functional activities. Patient also has difficulties with functional movement patterns such as squats, hinges which could impact ability to perform activities at home. Patient will therefore benefit from skilled therapy to aid in hip strength targeting and emphasize functional strengthening with squatting patterns to aid in return to household activities pain-free.  Pain/symptoms after session (out of 10): 0  Prognosis: patient will benefit from skilled therapy  Rehabilitative potential is: very good     • Predicted patient presentation: Moderate (evolving) - Patient comorbidities and complexities, as defined above, may have varying impact on steady progress for prescribed plan of care.  Patient Education:   Who will be receiving education: patient  Are they ready to learn: yes  Preferred learning style: written, verbal and demonstration  Barriers to learning: no barriers apparent at this time  Results of above outlined education: Verbalizes understanding and Demonstrates understanding      PLAN                                                                                                                          The following skilled interventions to be implemented to achieve goals listed below:Activities of Daily Living/Self Care (98520)  Dry Needling  Gait Training (05509)  Manual Therapy (91564)  Neuromuscular Re-Education (90536)  Therapeutic Activity (75603)  Therapeutic Exercise (28840)  Electrical Stimulation (unattended, 26683 or )  Electrical Stimulation (attended, 00369)  Heat/Cold (86717)  Ultrasound/Phonophoresis (56818)  Frequency / Duration: 1 times per week tapering as patient progresses for an estimated total of 4 visits for 4 weeks    Patient involved in and agreed to plan of care and goals.  Patient given attendance policy at time of initial evaluation. and Patient offered attendance policy at [facility].  Suggestions for next session as indicated: Progress per plan of care. Progress hip bridge (adduction/abduction biased). Add in hip internal/external rotation strength. Progress core stabilization (side planks).       GOALS                                                                                                                           Long Term Goals: to be met by end of plan of care  1. Patient will be able to squat to lift 15lbs to hip height and walk 30' on even ground for ability to perform ADLs at home.  2. Patient will be able to walk dog for 30-minutes on compliant/non-compliant surfaces with back pain <2/10 for improved activity tolerance.  3. Patient will improve Oswestry to 17% indicating an MCID improvement in low back function (12.8).  4. Patient will demonstrate adherence to HEP by end of plan of care.      Therapy procedure time and total treatment time can be found documented on the Time Entry flowsheet   supervision

## 2024-04-30 NOTE — SWALLOW BEDSIDE ASSESSMENT ADULT - COMMENTS
The pt was admitted to  with acute right facial weakness in absence of any other alli neuro sequel. Bell's Palsy suspected. Pt on steroids and anti viral meds. CTH negative. The pt reported that he had COVID in a few weeks ago.

## 2024-04-30 NOTE — SWALLOW BEDSIDE ASSESSMENT ADULT - SWALLOW EVAL: CRITERIA FOR SKILLED INTERVENTION MET
DO NOT FEEL THAT ACUTE SPEECH PATHOLOGY FOLLOW UP WOULD CHANGE CLINICAL MANAGEMENT/OUTCOME IN HOSPITAL SETTING AT THIS TIME. PT'S OROPHARYNGEAL SWALLOWING INTEGRITY AND SPEECH-LANGUAGE INTEGRITY ARE FUNCTIONAL, DESPITE BELL'S PALSY. GIVEN ABOVE, THIS SERVICE WILL NOT ACTIVELY FOLLOW IN HOUSE. RECONSULT PRN SHOULD STATUS CHANGE AND CONDITION WARRANT.

## 2024-04-30 NOTE — CONSULT NOTE ADULT - SUBJECTIVE AND OBJECTIVE BOX
Patient is a 58y old  Male who presents with a chief complaint of R facial droop (30 Apr 2024 16:35)    HPI:  59 y/o M w/ no significant PMH, p/w R facial droop. Patient states he woke up around 5:30am and went to drink water when he realized that he had R sided facial droop. Patient states he had COVID approximately 6 weeks ago. Denies any other recent infection, rash, vesicles in general areas or cold sores. Denies weakness in arms / legs. Denies CP, SOB, cough, runny nose, sore throat, nausea, vomiting, abdominal pain, fever, chills. Started on steroids/valtrex.     PSH: Denies   PMH: as above    Meds: per reconciliation sheet, noted below  MEDICATIONS  (STANDING):  ascorbic acid 500 milliGRAM(s) Oral daily  aspirin  chewable 81 milliGRAM(s) Oral daily  atorvastatin 40 milliGRAM(s) Oral at bedtime  clopidogrel Tablet 75 milliGRAM(s) Oral daily  enoxaparin Injectable 40 milliGRAM(s) SubCutaneous every 24 hours  multivitamin 1 Tablet(s) Oral daily  omega-3-Acid Ethyl Esters 2 Gram(s) Oral daily  pantoprazole    Tablet 40 milliGRAM(s) Oral before breakfast  predniSONE   Tablet 60 milliGRAM(s) Oral daily  valACYclovir 500 milliGRAM(s) Oral every 12 hours    Allergies    No Known Allergies    Intolerances      Social: no smoking, no alcohol, no illegal drugs; no recent travel, no exposure to TB  FAMILY HISTORY:     no history of premature cardiovascular disease in first degree relatives    ROS: the patient denies fever, no chills, no HA, no dizziness, no sore throat, no blurry vision, no CP, no palpitations, no SOB, no cough, no abdominal pain, no diarrhea, no N/V, no dysuria, no leg pain, no claudication, no rash, no joint aches, no rectal pain or bleeding, no night sweats    All other systems reviewed and are negative    Vital Signs Last 24 Hrs  T(C): 36.6 (30 Apr 2024 14:46), Max: 36.8 (29 Apr 2024 23:38)  T(F): 97.8 (30 Apr 2024 14:46), Max: 98.2 (29 Apr 2024 23:38)  HR: 84 (30 Apr 2024 14:46) (74 - 134)  BP: 202/108 (30 Apr 2024 14:46) (175/97 - 213/114)  BP(mean): 127 (30 Apr 2024 01:10) (125 - 127)  RR: 18 (30 Apr 2024 14:46) (16 - 18)  SpO2: 97% (30 Apr 2024 14:46) (96% - 98%)    Parameters below as of 30 Apr 2024 14:46  Patient On (Oxygen Delivery Method): room air      Daily     Daily     PE:  Constitutional: NAD  HEENT: NC/AT, EOMI, PERRLA, conjunctivae clear; ears and nose atraumatic; pharynx benign R facial droop, R facial weakness  Neck: supple; thyroid not palpable  Back: no tenderness  Respiratory: respiratory effort normal; clear to auscultation  Cardiovascular: S1S2 regular, no murmurs  Abdomen: soft, not tender, not distended, positive BS; liver and spleen WNL  Genitourinary: no suprapubic tenderness  Lymphatic: no LN palpable  Musculoskeletal: no muscle tenderness, no joint swelling or tenderness  Extremities: no pedal edema  Neurological/ Psychiatric: AxOx3, Judgement and insight normal;  moving all extremities  Skin: no rashes; no palpable lesions    Labs: all available labs reviewed                        13.7   9.33  )-----------( 209      ( 30 Apr 2024 08:20 )             42.2     04-30    136  |  108  |  16  ----------------------------<  152<H>  4.1   |  24  |  1.25    Ca    9.4      30 Apr 2024 08:20    TPro  7.3  /  Alb  3.6  /  TBili  0.4  /  DBili  x   /  AST  23  /  ALT  32  /  AlkPhos  82  04-30     LIVER FUNCTIONS - ( 30 Apr 2024 08:20 )  Alb: 3.6 g/dL / Pro: 7.3 gm/dL / ALK PHOS: 82 U/L / ALT: 32 U/L / AST: 23 U/L / GGT: x           Urinalysis Basic - ( 30 Apr 2024 08:20 )    Color: x / Appearance: x / SG: x / pH: x  Gluc: 152 mg/dL / Ketone: x  / Bili: x / Urobili: x   Blood: x / Protein: x / Nitrite: x   Leuk Esterase: x / RBC: x / WBC x   Sq Epi: x / Non Sq Epi: x / Bacteria: x          Radiology: all available radiological tests reviewed  < from: MR Head No Cont (04.30.24 @ 13:36) >  IMPRESSION: 3 very small acute infarcts in the bilateral cerebri as   above. Additional subacute infarcts in the bilateral cerebri. Numerous   chronic infarcts as detailed above. Severe periventricular chronic   microvascular ischemic changes. No hemorrhage. No mass effect. Rest of   the chronic findings as above.    --- End of Report ---    < end of copied text >    Advanced directives addressed: full resuscitation

## 2024-04-30 NOTE — SWALLOW BEDSIDE ASSESSMENT ADULT - NS SPL SWALLOW CLINIC TRIAL FT
Pt w/right facial paresis in setting of [place] Palsy. Pt able to spontaneously compensate for the above by presenting PO to left side of oral cavity. On exam, the pt demonstrated grossly functional Oropharyngeal Swallowing integrity for age, despite Uriz's Palsy sequel. Bolus formation/transfer were mechanically functional, despite Bell's Palsy. Pt favored orally processing foods to the left side of his mouth. Swallow triggered in an acceptable time frame for age and laryngeal lift on palpation during swallowing trials was functional for age as well. NO behavioral aspiration signs exhibited. No change in O2 sats noted. Odynophagia was denied.

## 2024-04-30 NOTE — CONSULT NOTE ADULT - SUBJECTIVE AND OBJECTIVE BOX
Patient is a 58y old  Male who presents with a chief complaint of R facial droop (29 Apr 2024 20:23)      HPI:  Mr. Owusu is a 57 y/o M w/ no significant PMH, who presents with right facial droop. On 4/29 he woke up at 5:30 AM, went ot drink water and realized that he had a right facial droop. He had covid six weeks prior but denied any other recent illnesses. He denied any vesicular rash or cold sores.   He denied any weakness/numbness/tingling in the extremities.   The ED physician was planning on discharging him home with steroids, anti-virals until he was noted to have new changes on his EKG and had elevated BP.      PAST MEDICAL & SURGICAL HISTORY:  Denies:      FAMILY HISTORY:  HTN, father - esophageal cancer     Social Hx:  Former smoker - quit in 2022,  etoh - 1-2 drinks on weekend, drugs - denies     MEDICATIONS  (STANDING):  amLODIPine   Tablet 5 milliGRAM(s) Oral daily  ascorbic acid 500 milliGRAM(s) Oral daily  multivitamin 1 Tablet(s) Oral daily  omega-3-Acid Ethyl Esters 2 Gram(s) Oral daily  predniSONE   Tablet 60 milliGRAM(s) Oral daily       Allergies    No Known Allergies    Intolerances        ROS: Pertinent positives in HPI, all other ROS were reviewed and are negative.      Vital Signs Last 24 Hrs  T(C): 36.7 (30 Apr 2024 08:05), Max: 36.8 (29 Apr 2024 14:22)  T(F): 98 (30 Apr 2024 08:05), Max: 98.3 (29 Apr 2024 14:22)  HR: 91 (30 Apr 2024 08:05) (70 - 134)  BP: 175/97 (30 Apr 2024 08:05) (175/97 - 209/111)  BP(mean): 127 (30 Apr 2024 01:10) (125 - 130)  RR: 16 (30 Apr 2024 08:05) (16 - 18)  SpO2: 98% (30 Apr 2024 08:05) (96% - 99%)    Parameters below as of 30 Apr 2024 08:05  Patient On (Oxygen Delivery Method): room air            Constitutional: awake and alert.  HEENT: PERRLA, EOMI,   Neck: Supple.  Respiratory: Breath sounds are clear bilaterally  Cardiovascular: S1 and S2, regular / irregular rhythm  Gastrointestinal: soft, nontender  Extremities:  no edema  Vascular: Caritid Bruit - no  Musculoskeletal: no joint swelling/tenderness, no abnormal movements  Skin: No rashes    Neurological exam:  HF: A x O x 3. Appropriately interactive, normal affect. Speech fluent, No Aphasia or paraphasic errors. Naming /repetition intact   CN: ROBERT, EOMI, VFF, facial sensation normal, no NLFD, tongue midline, Palate moves equally, SCM equal bilaterally  Motor: No pronator drift, Strength 5/5 in all 4 ext, normal bulk and tone, no tremor, rigidity or bradykinesia.    Sens: Intact to light touch / PP/ VS/ JS    Reflexes: Symmetric and normal . BJ 2+, BR 2+, KJ 2+, AJ 2+, downgoing toes b/l  Coord:  No FNFA, dysmetria, NYA intact   Gait/Balance: Normal/Cannot test    NIHSS:          Labs:   04-30    136  |  108  |  16  ----------------------------<  152<H>  4.1   |  24  |  1.25    Ca    9.4      30 Apr 2024 08:20    TPro  7.3  /  Alb  3.6  /  TBili  0.4  /  DBili  x   /  AST  23  /  ALT  32  /  AlkPhos  82  04-30                              13.7   9.33  )-----------( 209      ( 30 Apr 2024 08:20 )             42.2       Radiology:  CT head 4/29/24:  Apparent nonspecific decreased density within the basis pontis could   represent artifact or ischemic changes, likely chronic.    Chronic left frontal periventricular infarcts and mild white matter   microvascular ischemic disease.    No definite brain edema. No acute intracranial hemorrhage.    CTA head and neck 4/29/24:  1.   Right carotid system:    No hemodynamically significant stenosis.  2.   Left carotid system:     No hemodynamically significant stenosis.  3.   Vertebral circulation:    Patent.  4.  Anterior intracranial circulation:     Unremarkable.  5.  Posterior intracranial circulation:    Unremarkable.  6.  No large vessel occlusion.     Patient is a 58y old  Male who presents with a chief complaint of R facial droop (29 Apr 2024 20:23)      HPI:  Mr. Owusu is a 57 y/o M w/ no significant PMH, who presents with right facial droop. On 4/29 he woke up at 5:30 AM, went ot drink water and realized that he had a right facial droop. He had covid six weeks prior but denied any other recent illnesses. He denied any vesicular rash or cold sores.   He denied any weakness/numbness/tingling in the extremities.   The ED physician was planning on discharging him home with steroids, anti-virals until he was noted to have new changes on his EKG and had elevated BP.      PAST MEDICAL & SURGICAL HISTORY:  Denies:      FAMILY HISTORY:  HTN, father - esophageal cancer     Social Hx:  Former smoker - quit in 2022,  etoh - 1-2 drinks on weekend, drugs - denies     MEDICATIONS  (STANDING):  amLODIPine   Tablet 5 milliGRAM(s) Oral daily  ascorbic acid 500 milliGRAM(s) Oral daily  multivitamin 1 Tablet(s) Oral daily  omega-3-Acid Ethyl Esters 2 Gram(s) Oral daily  predniSONE   Tablet 60 milliGRAM(s) Oral daily       Allergies    No Known Allergies    Intolerances        ROS: Pertinent positives in HPI, all other ROS were reviewed and are negative.      Vital Signs Last 24 Hrs  T(C): 36.7 (30 Apr 2024 08:05), Max: 36.8 (29 Apr 2024 14:22)  T(F): 98 (30 Apr 2024 08:05), Max: 98.3 (29 Apr 2024 14:22)  HR: 91 (30 Apr 2024 08:05) (70 - 134)  BP: 175/97 (30 Apr 2024 08:05) (175/97 - 209/111)  BP(mean): 127 (30 Apr 2024 01:10) (125 - 130)  RR: 16 (30 Apr 2024 08:05) (16 - 18)  SpO2: 98% (30 Apr 2024 08:05) (96% - 99%)    Parameters below as of 30 Apr 2024 08:05  Patient On (Oxygen Delivery Method): room air            Constitutional: awake and alert.  HEENT: PERRLA, EOMI,   Neck: Supple.  Respiratory: Breath sounds are clear bilaterally  Cardiovascular: S1 and S2, regular / irregular rhythm  Gastrointestinal: soft, nontender  Extremities:  no edema  Musculoskeletal: no joint swelling/tenderness, no abnormal movements  Skin: No rashes    Neurological exam:  HF: A x O x 3. Appropriately interactive, normal affect. Speech fluent, No Aphasia or paraphasic errors.   CN: ROBERT, EOMI, VFF, facial sensation normal, Right upper and lower facial weakness, unable to close right eye against resistance, tongue midline, Palate moves equally, SCM equal bilaterally  Motor: No pronator drift, Strength 5/5 in all 4 ext, normal bulk and tone, no tremor, rigidity or bradykinesia.    Sens: Intact to light touch   Reflexes: Symmetric and normal . BJ 1+, BR 1+, KJ 1+,  downgoing toes b/l  Coord:  No FNFA, dysmetria, NYA intact   Gait/Balance: Narrow based, steady    NIHSS: 1          Labs:   04-30    136  |  108  |  16  ----------------------------<  152<H>  4.1   |  24  |  1.25    Ca    9.4      30 Apr 2024 08:20    TPro  7.3  /  Alb  3.6  /  TBili  0.4  /  DBili  x   /  AST  23  /  ALT  32  /  AlkPhos  82  04-30                              13.7   9.33  )-----------( 209      ( 30 Apr 2024 08:20 )             42.2       Radiology:  CT head 4/29/24:  Apparent nonspecific decreased density within the basis pontis could   represent artifact or ischemic changes, likely chronic.    Chronic left frontal periventricular infarcts and mild white matter   microvascular ischemic disease.    No definite brain edema. No acute intracranial hemorrhage.    CTA head and neck 4/29/24:  1.   Right carotid system:    No hemodynamically significant stenosis.  2.   Left carotid system:     No hemodynamically significant stenosis.  3.   Vertebral circulation:    Patent.  4.  Anterior intracranial circulation:     Unremarkable.  5.  Posterior intracranial circulation:    Unremarkable.  6.  No large vessel occlusion.

## 2024-04-30 NOTE — PHYSICAL THERAPY INITIAL EVALUATION ADULT - PERTINENT HX OF CURRENT PROBLEM, REHAB EVAL
59 y/o M w/ no significant PMH, w R facial droop, Suspect Quinn Palsy rather than CVA, CTH: No acute findings. Patient denies any difficulties with speech.  Patient denies any weakness in arms or legs.  Patient reports that he is unable to close right eye fully also with droop to right side of his lips. 57 y/o M w/ no significant PMH, w R facial droop, Suspect Eure Palsy rather than CVA, CTH: No acute findings, Chronic left frontal periventricular infarcts and mild white matter   microvascular ischemic disease. Patient denies any difficulties with speech.  Patient denies any weakness in arms or legs.  Patient reports that he is unable to close right eye fully also with droop to right side of his lips. NIHSS: 1  -MRI brain

## 2024-04-30 NOTE — SWALLOW BEDSIDE ASSESSMENT ADULT - SWALLOW EVAL: PROGNOSIS
2) On encounter, right facial paresis is apparent. The pt was alert and interactive. Hs receptive language abilities are preserved and he was able to verbalize during communicative probes. At these times, pt's motor speech integrity was felt to be functional and speech intelligibility was 100%, despite Bell's Palsy on right. Moreover, his underlying verbalizations were fluent, linguistically intact and contextually appropriate. The pt is able to verbalize intents and is communicatively competent. 2) On encounter, right facial paresis is apparent. The pt was alert and interactive. His receptive language abilities are preserved and he was able to verbalize during communicative probes. At these times, pt's motor speech integrity was felt to be functional and speech intelligibility was 100%, despite Bell's Palsy on right. Moreover, his underlying verbalizations were fluent, linguistically intact and contextually appropriate. The pt is able to verbalize intents and is communicatively competent.

## 2024-04-30 NOTE — SWALLOW BEDSIDE ASSESSMENT ADULT - SLP GENERAL OBSERVATIONS
On encounter, right facial paresis is apparent. The pt was alert and interactive. His receptive language abilities are preserved and he was able to verbalize during communicative probes. At these times, pt's motor speech integrity was felt to be functional and speech intelligibility was 100%, despite Bell's Palsy on right. Moreover, his underlying verbalizations were fluent, linguistically intact and contextually appropriate. The pt is able to verbalize intents and is communicatively competent.

## 2024-04-30 NOTE — CONSULT NOTE ADULT - PROBLEM SELECTOR RECOMMENDATION 9
markedly elevated blood pressure , without headache , on steroid ,  with evidence of hypertensive heart disease   will give extra dose amlodipine 5 mg stat ,  hydralazine IV ,   will give losartan 50 mg  now ,      encourage patient to follow low salt diet ,

## 2024-04-30 NOTE — SWALLOW BEDSIDE ASSESSMENT ADULT - SWALLOW EVAL: RECOMMENDED DIET
SUGGEST A REGULAR CONSISTENCY DIET WITH THIN LIQUID TEXTURES AS THE PATIENT APPEARED CLINICALLY TOLERANT OF THESE FOOD CONSISTENCIES FROM AN OROPHARYNGEAL SWALLOWING PERSPECTIVE ON EXAM, DESPITE BELL'S PALSY.

## 2024-05-01 DIAGNOSIS — I63.9 CEREBRAL INFARCTION, UNSPECIFIED: ICD-10-CM

## 2024-05-01 LAB
B BURGDOR C6 AB SER-ACNC: NEGATIVE — SIGNIFICANT CHANGE UP
B BURGDOR IGG+IGM SER QL IB: SIGNIFICANT CHANGE UP
B BURGDOR IGG+IGM SER-ACNC: 0.07 INDEX — SIGNIFICANT CHANGE UP (ref 0.01–0.89)
BABESIA MICROTI PCR, BLD RESULT: SIGNIFICANT CHANGE UP

## 2024-05-01 PROCEDURE — 99233 SBSQ HOSP IP/OBS HIGH 50: CPT

## 2024-05-01 PROCEDURE — 99232 SBSQ HOSP IP/OBS MODERATE 35: CPT

## 2024-05-01 RX ADMIN — CLOPIDOGREL BISULFATE 75 MILLIGRAM(S): 75 TABLET, FILM COATED ORAL at 09:26

## 2024-05-01 RX ADMIN — VALACYCLOVIR 500 MILLIGRAM(S): 500 TABLET, FILM COATED ORAL at 21:30

## 2024-05-01 RX ADMIN — PANTOPRAZOLE SODIUM 40 MILLIGRAM(S): 20 TABLET, DELAYED RELEASE ORAL at 06:14

## 2024-05-01 RX ADMIN — ATORVASTATIN CALCIUM 40 MILLIGRAM(S): 80 TABLET, FILM COATED ORAL at 21:31

## 2024-05-01 RX ADMIN — Medication 1 TABLET(S): at 09:26

## 2024-05-01 RX ADMIN — Medication 2 GRAM(S): at 09:26

## 2024-05-01 RX ADMIN — LOSARTAN POTASSIUM 50 MILLIGRAM(S): 100 TABLET, FILM COATED ORAL at 09:26

## 2024-05-01 RX ADMIN — ENOXAPARIN SODIUM 40 MILLIGRAM(S): 100 INJECTION SUBCUTANEOUS at 18:41

## 2024-05-01 RX ADMIN — VALACYCLOVIR 500 MILLIGRAM(S): 500 TABLET, FILM COATED ORAL at 09:27

## 2024-05-01 RX ADMIN — AMLODIPINE BESYLATE 10 MILLIGRAM(S): 2.5 TABLET ORAL at 09:26

## 2024-05-01 RX ADMIN — Medication 0.1 MILLIGRAM(S): at 18:39

## 2024-05-01 RX ADMIN — Medication 60 MILLIGRAM(S): at 09:30

## 2024-05-01 RX ADMIN — Medication 81 MILLIGRAM(S): at 09:26

## 2024-05-01 RX ADMIN — Medication 500 MILLIGRAM(S): at 09:26

## 2024-05-01 RX ADMIN — Medication 10 MILLIGRAM(S): at 06:15

## 2024-05-01 NOTE — PROGRESS NOTE ADULT - PROBLEM SELECTOR PLAN 2
Problem: EKG, abnormal.   ·  Recommendation: Left axis  LVH Right bundle branch block  likely hypertensive heart disease , will obtain echo to assess ventricular function ,   spoke to primary , patient similar ekg last Friday at his office ,  ( patient did not see any physician from 2010 until last friday )   - TTE with preserved LVEF 60-65%, no RWMA, mod LVH  - outpatient ischemic workup - should followup with Dr. Palla in one week

## 2024-05-01 NOTE — PROGRESS NOTE ADULT - PROBLEM SELECTOR PLAN 1
- BP improved , will allow for permissive HTN as pt. with 3 acute infarcts on head MRI and multiple old infarcts, will taper down BP slowly, cont, current regimen with losartan 50 mg po daily and norvasc 10 mg po daily

## 2024-05-01 NOTE — PROGRESS NOTE ADULT - PROBLEM SELECTOR PLAN 4
pt. with 3 acute infarcts likely due to uncontrolled HTN, TTE with preserved LVEF 60-65%, no RWMA, mod LVH  will have EP consult for ILR, Management as per Neurology

## 2024-05-02 ENCOUNTER — TRANSCRIPTION ENCOUNTER (OUTPATIENT)
Age: 59
End: 2024-05-02

## 2024-05-02 LAB — B BURGDOR DNA SPEC QL NAA+PROBE: NEGATIVE — SIGNIFICANT CHANGE UP

## 2024-05-02 PROCEDURE — 99232 SBSQ HOSP IP/OBS MODERATE 35: CPT

## 2024-05-02 RX ORDER — LOSARTAN POTASSIUM 100 MG/1
100 TABLET, FILM COATED ORAL DAILY
Refills: 0 | Status: DISCONTINUED | OUTPATIENT
Start: 2024-05-02 | End: 2024-05-04

## 2024-05-02 RX ADMIN — Medication 1 DROP(S): at 06:44

## 2024-05-02 RX ADMIN — LOSARTAN POTASSIUM 100 MILLIGRAM(S): 100 TABLET, FILM COATED ORAL at 16:59

## 2024-05-02 RX ADMIN — ENOXAPARIN SODIUM 40 MILLIGRAM(S): 100 INJECTION SUBCUTANEOUS at 18:29

## 2024-05-02 RX ADMIN — Medication 0.1 MILLIGRAM(S): at 16:57

## 2024-05-02 RX ADMIN — ATORVASTATIN CALCIUM 40 MILLIGRAM(S): 80 TABLET, FILM COATED ORAL at 21:25

## 2024-05-02 RX ADMIN — PANTOPRAZOLE SODIUM 40 MILLIGRAM(S): 20 TABLET, DELAYED RELEASE ORAL at 06:43

## 2024-05-02 RX ADMIN — VALACYCLOVIR 500 MILLIGRAM(S): 500 TABLET, FILM COATED ORAL at 21:25

## 2024-05-02 RX ADMIN — Medication 0.1 MILLIGRAM(S): at 10:51

## 2024-05-02 NOTE — DISCHARGE NOTE PROVIDER - CARE PROVIDERS DIRECT ADDRESSES
,[email or web address],DirectAddress_Unknown,[email or web address]

## 2024-05-02 NOTE — DISCHARGE NOTE PROVIDER - NSDCMRMEDTOKEN_GEN_ALL_CORE_FT
Fish Oil 1000 mg oral capsule: 1 cap(s) orally once a day  Multiple Vitamins oral tablet: 1 tab(s) orally once a day  Natural Thyroid Booster OTC supplement:   turmeric 500 mg oral capsule: 1 cap(s) orally once a day  Vitamin C 500 mg oral tablet: 1 tab(s) orally once a day   amLODIPine 10 mg oral tablet: 1 tab(s) orally once a day  aspirin 81 mg oral tablet, chewable: 1 tab(s) orally once a day  atorvastatin 40 mg oral tablet: 1 tab(s) orally once a day (at bedtime)  clopidogrel 75 mg oral tablet: 1 tab(s) orally once a day STOP 5/20  Fish Oil 1000 mg oral capsule: 1 cap(s) orally once a day  hydrALAZINE 50 mg oral tablet: 1 tab(s) orally once a day  losartan 100 mg oral tablet: 1 tab(s) orally once a day  Multiple Vitamins oral tablet: 1 tab(s) orally once a day  Natural Thyroid Booster OTC supplement:   ocular lubricant ophthalmic solution: 1 drop(s) to each affected eye 4 times a day As needed Dry Eyes  pantoprazole 40 mg oral delayed release tablet: 1 tab(s) orally once a day (before a meal)  predniSONE 20 mg oral tablet: 3 tab(s) orally once a day  turmeric 500 mg oral capsule: 1 cap(s) orally once a day  Vitamin C 500 mg oral tablet: 1 tab(s) orally once a day   amLODIPine 10 mg oral tablet: 1 tab(s) orally once a day  aspirin 81 mg oral tablet, chewable: 1 tab(s) orally once a day  atorvastatin 40 mg oral tablet: 1 tab(s) orally once a day (at bedtime)  clopidogrel 75 mg oral tablet: 1 tab(s) orally once a day STOP 5/20  Fish Oil 1000 mg oral capsule: 1 cap(s) orally once a day  hydrALAZINE 50 mg oral tablet: 1 tab(s) orally 3 times a day  losartan 100 mg oral tablet: 1 tab(s) orally once a day  Multiple Vitamins oral tablet: 1 tab(s) orally once a day  Natural Thyroid Booster OTC supplement:   ocular lubricant ophthalmic solution: 1 drop(s) to each affected eye 4 times a day As needed Dry Eyes  pantoprazole 40 mg oral delayed release tablet: 1 tab(s) orally once a day (before a meal)  predniSONE 20 mg oral tablet: 3 tab(s) orally once a day  turmeric 500 mg oral capsule: 1 cap(s) orally once a day  Vitamin C 500 mg oral tablet: 1 tab(s) orally once a day

## 2024-05-02 NOTE — DISCHARGE NOTE PROVIDER - NSDCCAREPROVSEEN_GEN_ALL_CORE_FT
Kyrie, Shanelle Michel, Karma Wood, James Bonner, Paul C Palla, Joshua Tidwell, Maday Collins, Jaymie Piper, Razia Christensen, Bradley Lopez, Faviola Nicole, Malou ALVARADO

## 2024-05-02 NOTE — DISCHARGE NOTE PROVIDER - PROVIDER TOKENS
PROVIDER:[TOKEN:[[id number]],FOLLOWUP:[1 week]],PROVIDER:[TOKEN:[[id number]],FOLLOWUP:[1 month]],PROVIDER:[TOKEN:[[id number]],FOLLOWUP:[2 weeks]] PROVIDER:[TOKEN:[[id number]],FOLLOWUP:[1 week]],PROVIDER:[TOKEN:[[id number]],FOLLOWUP:[1 month]],PROVIDER:[TOKEN:[[id number]],FOLLOWUP:[2 weeks]],PROVIDER:[TOKEN:[[id number]],SCHEDULEDAPPT:[06/18/2024]]

## 2024-05-02 NOTE — DISCHARGE NOTE PROVIDER - NSDCCPCAREPLAN_GEN_ALL_CORE_FT
PRINCIPAL DISCHARGE DIAGNOSIS  Diagnosis: Bell's palsy  Assessment and Plan of Treatment:       SECONDARY DISCHARGE DIAGNOSES  Diagnosis: Stroke  Assessment and Plan of Treatment:     Diagnosis: HTN (hypertension)  Assessment and Plan of Treatment:      PRINCIPAL DISCHARGE DIAGNOSIS  Diagnosis: Bell's palsy  Assessment and Plan of Treatment:       SECONDARY DISCHARGE DIAGNOSES  Diagnosis: Stroke  Assessment and Plan of Treatment: You were admitted to the hospital because you had a stroke. This is when a blood clot blocks a blood vessel in your brain, and causes damage to part of the brain that control parts of the body.   You have these risks factors of strokes:   -high blood pressure (also called hypertension)  -high cholesterol (also called hyperlipidemia)  Please take your Plavix until 5/20/24  Continue Aspirin 81 mg once daily   Continue Lipitor 40 mg once daily   In the event of an unexplained stroke, we recommend having your heart rhythm recorded using a cardiac monitor. Mobile Cardiac Telemetry (MCT or MCOT) is an advanced cardiac monitoring device that automatically monitors, records, and transmits the abnormal heart rhythm for 24 hours up to 30 days. It picks up the heart's electrical signals just as an ECG throughout the day and night, even when the patient is asleep. You will follow up with the Cardiologist to review the findings of the cardiac monitor.    Diagnosis: HTN (hypertension)  Assessment and Plan of Treatment: Hypertension is the medical term for high blood pressure. Blood pressure refers to the pressure that blood applies to the inner walls of the arteries. Arteries carry blood from the heart to other organs and parts of the body. Untreated high blood pressure increases the strain on the heart and arteries, eventually causing organ damage. High blood pressure increases the risk of heart failure, heart attack (myocardial infarction), stroke, and kidney failure. High blood pressure does not usually cause any symptoms. Treatment of hypertension usually begins with lifestyle changes. Making these lifestyle changes involves little or no risk. Recommended changes often include reducing the amount of salt in your diet, losing weight if you are overweight or obese, avoiding drinking too much alcohol, stopping smoking and exercising at least 30 minutes per day most days of the week. If you are prescribed medication for your hypertension it is important to take these as prescribed to prevent the possible complications of uncontrolled hypertension.   New medications:   --Amlodipine 10 mg once per day  --Losartan 100 mg once per day  -Hydralazine 50 mg three times per day     PRINCIPAL DISCHARGE DIAGNOSIS  Diagnosis: Bell's palsy  Assessment and Plan of Treatment: Bell palsy is a sudden weakness or paralysis of one side of your face. It occurs when the nerve that controls the muscles in your face becomes swollen or irritated. Bell palsy usually lasts about 2 to 3 weeks, but it can last for up to 6 months. Bell palsy can be permanent for some people.  Continue Prednisone 60 mg as prescribed for 3 additional days.      SECONDARY DISCHARGE DIAGNOSES  Diagnosis: Stroke  Assessment and Plan of Treatment: You were admitted to the hospital because you had a stroke. This is when a blood clot blocks a blood vessel in your brain, and causes damage to part of the brain that control parts of the body.   You have these risks factors of strokes:   -high blood pressure (also called hypertension)  -high cholesterol (also called hyperlipidemia)  Please take your Plavix until 5/20/24  Continue Aspirin 81 mg once daily   Continue Lipitor 40 mg once daily   In the event of an unexplained stroke, we recommend having your heart rhythm recorded using a cardiac monitor. Mobile Cardiac Telemetry (MCT or MCOT) is an advanced cardiac monitoring device that automatically monitors, records, and transmits the abnormal heart rhythm for 24 hours up to 30 days. It picks up the heart's electrical signals just as an ECG throughout the day and night, even when the patient is asleep. You will follow up with the Electrophysiologist to review the findings of the cardiac monitor.  Please follow up with the Neurologist within 1 month of discharge.    Diagnosis: HTN (hypertension)  Assessment and Plan of Treatment: Hypertension is the medical term for high blood pressure. Blood pressure refers to the pressure that blood applies to the inner walls of the arteries. Arteries carry blood from the heart to other organs and parts of the body. Untreated high blood pressure increases the strain on the heart and arteries, eventually causing organ damage. High blood pressure increases the risk of heart failure, heart attack (myocardial infarction), stroke, and kidney failure. High blood pressure does not usually cause any symptoms. Treatment of hypertension usually begins with lifestyle changes. Making these lifestyle changes involves little or no risk. Recommended changes often include reducing the amount of salt in your diet, losing weight if you are overweight or obese, avoiding drinking too much alcohol, stopping smoking and exercising at least 30 minutes per day most days of the week. If you are prescribed medication for your hypertension it is important to take these as prescribed to prevent the possible complications of uncontrolled hypertension.   New medications:   --Amlodipine 10 mg once per day  --Losartan 100 mg once per day  --Hydralazine 50 mg three times per day  Please follow up with the Cardiologist, Dr. Palla, within 1 week of discharge for further management and workup.

## 2024-05-02 NOTE — DISCHARGE NOTE PROVIDER - NSDCFUSCHEDAPPT_GEN_ALL_CORE_FT
Jovani Alan  [facility] Physician [address]  Scheduled Appointment: 06/18/2024

## 2024-05-02 NOTE — DISCHARGE NOTE PROVIDER - HOSPITAL COURSE
#Discharge: do not delete (INCOMPLETE)     Admission HPI: 59 y/o M w/ no significant PMH, p/w R facial droop. Patient states he woke up around 5:30am and went to drink water when he realized that he had R sided facial droop. Patient states he had COVID approximately 6 weeks ago. Denies any other recent infection, rash, vesicles in general areas or cold sores. Denies weakness in arms / legs. Denies CP, SOB, cough, runny nose, sore throat, nausea, vomiting, abdominal pain, fever, chills.    5/2:     Hospital course (by problem):   *R facial droop -  [place] Palsy  -Clinical findings more indicative of [place] palsy, rather than CVA. However, will consult Neuro and will defer to neuro regarding work up for CVA   prednisone for  bells palsy  -Lyme panel noted, negative  -ID consult appreciated  -Continue valacyclovir 500 mg BID for total of 5 days   -Artifical eye drops   -Eye patch at night    * Acute CVA:  3 acute cerebral and multiple subacute infarcts  start ASA 81 mg  +Plavix 75 mg daily x21 days then aspirin monotherapy   TTE with preserved LVEF 60-65%, no RWMA, mod LVH  EP cardio for ILR  No A fib on tele  continue tele monitoring  control BP  ; start atorvastatin 40 mg   A1c 6.1  neuro f/u    *Bifasciular block on EKG  -Old EKG not available for comparison  -Cardio consult appreciated. Similar EKG in outpatient office last Friday. Outpatient ischemic workup - follow up with Dr. Palla in 1 week     *Hypertensive urgency  -S/p labetaolol in ED  started on amlodipine 10 mg, losartan 50 mg   hydralazine iv prn  add clonidine 0.1 mg daily  up titrate meds as needed    Patient was discharged to: Home    Physical exam at the time of discharge:  LOS: 3d    VITALS:   T(C): 36.9 (05-02-24 @ 06:36), Max: 37.1 (05-01-24 @ 21:46)  HR: 73 (05-02-24 @ 06:36) (73 - 85)  BP: 160/98 (05-02-24 @ 06:36) (160/98 - 184/94)  RR: 18 (05-02-24 @ 06:36) (17 - 18)  SpO2: 96% (05-02-24 @ 06:36) (95% - 98%)    PHYSICAL EXAM:  Constitutional: NAD, awake and alert, well-developed  HEENT: PERR, EOMI, MMM  Neck:  supple,  No JVD  Respiratory: Breath sounds are clear bilaterally, No wheezing, rales or rhonchi  Cardiovascular: S1 and S2, regular rate and rhythm, no Murmurs, gallops or rubs  Gastrointestinal: Bowel Sounds present, soft, nontender.   Extremities: No peripheral edema. No clubbing or cyanosis.  Vascular: 2+ peripheral pulses  Neurological: A/O x 3, right facial palsy other wise no other  focal deficits  Musculoskeletal: no calf tenderness  Skin: No rashes         #Discharge: do not delete (INCOMPLETE)     Admission HPI: 57 y/o M w/ no significant PMH, p/w R facial droop. Patient states he woke up around 5:30am and went to drink water when he realized that he had R sided facial droop. Patient states he had COVID approximately 6 weeks ago. Denies any other recent infection, rash, vesicles in general areas or cold sores. Denies weakness in arms / legs. Denies CP, SOB, cough, runny nose, sore throat, nausea, vomiting, abdominal pain, fever, chills.    5/4: BP improved with PO hydralazine. AM BP elevated prior to medications, but improved throughout the day. No complaints.     Hospital course (by problem):   *R facial droop -  [place] Palsy  -Clinical findings more indicative of [place] palsy, rather than CVA. However, will consult Neuro and will defer to neuro regarding work up for CVA   prednisone for  bells palsy  -Lyme panel noted, negative  -ID consult appreciated  -Continue valacyclovir 500 mg BID for total of 5 days   -Artifical eye drops   -Eye patch at night    *Acute CVA:  3 acute cerebral and multiple subacute infarcts  start ASA 81 mg  +Plavix 75 mg daily x21 days then aspirin monotherapy   TTE with preserved LVEF 60-65%, no RWMA, mod LVH  EP cardio for MCOT placement prior to discharge  No A fib on tele  continue tele monitoring  control BP  ; start atorvastatin 40 mg   A1c 6.1  neuro f/u    *Bifasciular block on EKG  -Old EKG not available for comparison  -Cardio consult appreciated. Similar EKG in outpatient office last Friday. Outpatient ischemic workup - follow up with Dr. Palla in 1 week     *Hypertensive urgency  -S/p labetaolol in ED  started on amlodipine 10 mg, losartan 50 mg, clonidine 0.1  mg. Increased Losartan to 100 mg and Clonidine to 0.2 yesterday, however BP still uncontrolled. Discontinue Clonidine and start Hydralazine (discussed with Cardiology). BP with improvement  hydralazine iv prn  up titrate meds as needed    Patient was discharged to: Home    Physical exam at the time of discharge:  LOS: 3d    VITALS:   T(C): 36.9 (05-02-24 @ 06:36), Max: 37.1 (05-01-24 @ 21:46)  HR: 73 (05-02-24 @ 06:36) (73 - 85)  BP: 160/98 (05-02-24 @ 06:36) (160/98 - 184/94)  RR: 18 (05-02-24 @ 06:36) (17 - 18)  SpO2: 96% (05-02-24 @ 06:36) (95% - 98%)    PHYSICAL EXAM:  Constitutional: NAD, awake and alert, well-developed  HEENT: PERR, EOMI, MMM  Neck:  supple,  No JVD  Respiratory: Breath sounds are clear bilaterally, No wheezing, rales or rhonchi  Cardiovascular: S1 and S2, regular rate and rhythm, no Murmurs, gallops or rubs  Gastrointestinal: Bowel Sounds present, soft, nontender.   Extremities: No peripheral edema. No clubbing or cyanosis.  Vascular: 2+ peripheral pulses  Neurological: A/O x 3, right facial palsy other wise no other  focal deficits  Musculoskeletal: no calf tenderness  Skin: No rashes         Admission HPI: 57 y/o M w/ no significant PMH, p/w R facial droop. Patient states he woke up around 5:30am and went to drink water when he realized that he had R sided facial droop. Patient states he had COVID approximately 6 weeks ago. Denies any other recent infection, rash, vesicles in general areas or cold sores. Denies weakness in arms / legs. Denies CP, SOB, cough, runny nose, sore throat, nausea, vomiting, abdominal pain, fever, chills.    5/4: BP improved with PO hydralazine. AM BP elevated prior to medications, but improved throughout the day. No complaints.     Hospital course (by problem):   *R facial droop -  [place] Palsy  -Started on Prednisone in the hospital, discharge with 3 additional days to complete 1 week course   -Lyme panel noted, negative  -ID consult appreciated  -s/p valacyclovir 500 mg BID for total of 5 days   -Artifical eye drops   -Eye patch at night    *Acute CVA:  3 acute cerebral and multiple subacute infarcts  start ASA 81 mg  +Plavix 75 mg daily x21 days then aspirin monotherapy   TTE with preserved LVEF 60-65%, no RWMA, mod LVH  EP cardio for MCOT placement prior to discharge  No A fib on tele  continue tele monitoring  control BP  ; start atorvastatin   A1c 6.1  neuro recs appreciated     *Bifasciular block on EKG  -Old EKG not available for comparison  -Cardio consult appreciated. Similar EKG in outpatient office last Friday. Outpatient ischemic workup - follow up with Dr. Palla in 1 week     *Hypertensive urgency  -S/p labetaolol in ED  started on amlodipine 10 mg, losartan 50 mg, clonidine 0.1  mg. Increased Losartan to 100 mg and Clonidine to 0.2 yesterday, however BP still uncontrolled. Discontinued Clonidine and started Hydralazine (discussed with Cardiology). BP improved with PO Hydralazine   up titrate meds as needed outpatient    Patient was discharged to: Home. Declines home care     Physical exam at the time of discharge:  LOS: 3d    VITALS:   T(C): 36.9 (05-02-24 @ 06:36), Max: 37.1 (05-01-24 @ 21:46)  HR: 73 (05-02-24 @ 06:36) (73 - 85)  BP: 160/98 (05-02-24 @ 06:36) (160/98 - 184/94)  RR: 18 (05-02-24 @ 06:36) (17 - 18)  SpO2: 96% (05-02-24 @ 06:36) (95% - 98%)    PHYSICAL EXAM:  Constitutional: NAD  HEENT: PERR, EOMI, MMM  Neck:  supple,  No JVD  Respiratory: Breath sounds are clear bilaterally, No wheezing, rales or rhonchi  Cardiovascular: S1 and S2, regular rate and rhythm, no Murmurs, gallops or rubs  Gastrointestinal: Bowel Sounds present, soft, nontender.   Extremities: No peripheral edema. No clubbing or cyanosis.  Vascular: 2+ peripheral pulses  Neurological: A/O x 3, right facial palsy other wise no other focal deficits  Musculoskeletal: no calf tenderness  Skin: No rashes

## 2024-05-02 NOTE — CONSULT NOTE ADULT - SUBJECTIVE AND OBJECTIVE BOX
HPI: This is a 59 y/o M w/ no significant PMH, p/w R facial droop. Patient states he woke up around 5:30am and went to drink water when he realized that he had R sided facial droop. Patient states he had COVID approximately 6 weeks ago. Denies any other recent infection, rash, vesicles in general areas or cold sores. Denies weakness in arms / legs. Denies CP, SOB, cough, runny nose, sore throat, nausea, vomiting, abdominal pain, fever, chills   Pt had not seen his PMD since 2010.    PAST MEDICAL & SURGICAL HISTORY:  PSH: Denies     Social Hx: Former smoker - quit in 2022,  etoh - 1-2 drinks on weekend, drugs - denies      Family Hx: Mother - HTN, father - esophageal cancer  (29 Apr 2024 20:23)    5/2/24: EP team asked to evaluate this patient for possible ILR implant who is s/p multiple CVAs seen on imaging.  He also has been diagnosed with Bell's palsy and had malignant HTN.   pt seen resting in bed in NAD.  Right facial droop noted, he denies any symptoms of CP, SOB, dizziness, prior syncope, palpitations or fatigue.  He denies any known prior hx of Atrial fibrillation.  TELE: sinus rhythm 60-70 bpm, no events noted  EKG 4/29/24: SR 66 bpm, RBBB, LA 152ms, QRS 140ms, QTc 475ms      MEDICATIONS  (STANDING):  amLODIPine   Tablet 10 milliGRAM(s) Oral daily  ascorbic acid 500 milliGRAM(s) Oral daily  aspirin  chewable 81 milliGRAM(s) Oral daily  atorvastatin 40 milliGRAM(s) Oral at bedtime  cloNIDine 0.1 milliGRAM(s) Oral daily  clopidogrel Tablet 75 milliGRAM(s) Oral daily  enoxaparin Injectable 40 milliGRAM(s) SubCutaneous every 24 hours  losartan 50 milliGRAM(s) Oral daily  multivitamin 1 Tablet(s) Oral daily  omega-3-Acid Ethyl Esters 2 Gram(s) Oral daily  pantoprazole    Tablet 40 milliGRAM(s) Oral before breakfast  predniSONE   Tablet 60 milliGRAM(s) Oral daily  valACYclovir 500 milliGRAM(s) Oral every 12 hours    MEDICATIONS  (PRN):  acetaminophen     Tablet .. 650 milliGRAM(s) Oral every 6 hours PRN Mild Pain (1 - 3)  artificial tears (preservative free) Ophthalmic Solution 1 Drop(s) Right EYE four times a day PRN Dry Eyes  hydrALAZINE Injectable 10 milliGRAM(s) IV Push every 6 hours PRN For SBP more than 160      Allergies    No Known Allergies    Intolerances      Vital Signs Last 24 Hrs  T(C): 36.9 (02 May 2024 08:54), Max: 37.1 (01 May 2024 21:46)  T(F): 98.5 (02 May 2024 08:54), Max: 98.8 (01 May 2024 21:46)  HR: 65 (02 May 2024 08:54) (65 - 85)  BP: 190/97 (02 May 2024 08:54) (160/98 - 190/97)  BP(mean): --  RR: 16 (02 May 2024 08:54) (16 - 18)  SpO2: 93% (02 May 2024 08:54) (93% - 96%)    Parameters below as of 02 May 2024 08:54  Patient On (Oxygen Delivery Method): room air        REVIEW OF SYSTEMS:    CONSTITUTIONAL:  As per HPI.  HEENT:  Eyes:  No diplopia or blurred vision. ENT:  No earache, sore throat or runny nose.  CARDIOVASCULAR:  No pressure, squeezing, strangling, tightness, heaviness or aching about the chest, neck, axilla or epigastrium.  RESPIRATORY:  No cough, shortness of breath, PND or orthopnea.  GASTROINTESTINAL:  No nausea, vomiting or diarrhea.  GENITOURINARY:  No dysuria, frequency or urgency.  MUSCULOSKELETAL:  As per HPI.  SKIN:  No change in skin, hair or nails.  NEUROLOGIC:  No paresthesias, fasciculations, seizures or weakness.  PSYCHIATRIC:  No disorder of thought or mood.  ENDOCRINE:  No heat or cold intolerance, polyuria or polydipsia.  HEMATOLOGICAL:  No easy bruising or bleedings:  .     PHYSICAL EXAMINATION:    GENERAL APPEARANCE:  Pt. is not currently dyspneic, in no distress. Pt. is alert, oriented, and pleasant.  HEENT:  Pupils are normal and react normally. No icterus. Mucous membranes well colored.  NECK:  Supple. No lymphadenopathy. Jugular venous pressure not elevated. Carotids equal.   HEART:   The cardiac impulse has a normal quality. There are no murmurs, rubs or gallops noted  CHEST:  Chest is clear to auscultation. Normal respiratory effort.  ABDOMEN:  Soft and nontender.   EXTREMITIES:  There is no edema.   SKIN:  No rash or significant lesions are noted.    I&O's Summary      LABS:            CARDIAC TESTS:    RADIOLOGY & ADDITIONAL STUDIES:    ASSESSMENT & PLAN: HPI: This is a 57 y/o M w/ no significant PMH, p/w R facial droop. Patient states he woke up around 5:30am and went to drink water when he realized that he had R sided facial droop. Patient states he had COVID approximately 6 weeks ago. Denies any other recent infection, rash, vesicles in general areas or cold sores. Denies weakness in arms / legs. Denies CP, SOB, cough, runny nose, sore throat, nausea, vomiting, abdominal pain, fever, chills   Pt had not seen his PMD since 2010.    PAST MEDICAL & SURGICAL HISTORY:  PSH: Denies     Social Hx: Former smoker - quit in 2022,  etoh - 1-2 drinks on weekend, drugs - denies      Family Hx: Mother - HTN, father - esophageal cancer  (29 Apr 2024 20:23)    5/2/24: EP team asked to evaluate this patient for possible ILR implant who is s/p multiple CVAs seen on imaging.  He also has been diagnosed with Bell's palsy and had malignant HTN.   pt seen resting in bed in NAD.  Right facial droop noted, he denies any symptoms of CP, SOB, dizziness, prior syncope, palpitations or fatigue.  He denies any known prior hx of Atrial fibrillation.  TELE: sinus rhythm 60-70 bpm, no events noted  EKG 4/29/24: SR 66 bpm, RBBB, ME 152ms, QRS 140ms, QTc 475ms      MEDICATIONS  (STANDING):  amLODIPine   Tablet 10 milliGRAM(s) Oral daily  ascorbic acid 500 milliGRAM(s) Oral daily  aspirin  chewable 81 milliGRAM(s) Oral daily  atorvastatin 40 milliGRAM(s) Oral at bedtime  cloNIDine 0.1 milliGRAM(s) Oral daily  clopidogrel Tablet 75 milliGRAM(s) Oral daily  enoxaparin Injectable 40 milliGRAM(s) SubCutaneous every 24 hours  losartan 50 milliGRAM(s) Oral daily  multivitamin 1 Tablet(s) Oral daily  omega-3-Acid Ethyl Esters 2 Gram(s) Oral daily  pantoprazole    Tablet 40 milliGRAM(s) Oral before breakfast  predniSONE   Tablet 60 milliGRAM(s) Oral daily  valACYclovir 500 milliGRAM(s) Oral every 12 hours    MEDICATIONS  (PRN):  acetaminophen     Tablet .. 650 milliGRAM(s) Oral every 6 hours PRN Mild Pain (1 - 3)  artificial tears (preservative free) Ophthalmic Solution 1 Drop(s) Right EYE four times a day PRN Dry Eyes  hydrALAZINE Injectable 10 milliGRAM(s) IV Push every 6 hours PRN For SBP more than 160      Allergies    No Known Allergies    Intolerances      Vital Signs Last 24 Hrs  T(C): 36.9 (02 May 2024 08:54), Max: 37.1 (01 May 2024 21:46)  T(F): 98.5 (02 May 2024 08:54), Max: 98.8 (01 May 2024 21:46)  HR: 65 (02 May 2024 08:54) (65 - 85)  BP: 190/97 (02 May 2024 08:54) (160/98 - 190/97)  BP(mean): --  RR: 16 (02 May 2024 08:54) (16 - 18)  SpO2: 93% (02 May 2024 08:54) (93% - 96%)    Parameters below as of 02 May 2024 08:54  Patient On (Oxygen Delivery Method): room air        REVIEW OF SYSTEMS:    CONSTITUTIONAL:  As per HPI.  HEENT:  Eyes:  No diplopia or blurred vision. ENT:  No earache, sore throat or runny nose.  CARDIOVASCULAR:  No pressure, squeezing, strangling, tightness, heaviness or aching about the chest, neck, axilla or epigastrium.  RESPIRATORY:  No cough, shortness of breath, PND or orthopnea.  GASTROINTESTINAL:  No nausea, vomiting or diarrhea.  GENITOURINARY:  No dysuria, frequency or urgency.  MUSCULOSKELETAL:  As per HPI.  SKIN:  No change in skin, hair or nails.  NEUROLOGIC:  No paresthesias, fasciculations, seizures or weakness.  PSYCHIATRIC:  No disorder of thought or mood.  ENDOCRINE:  No heat or cold intolerance, polyuria or polydipsia.  HEMATOLOGICAL:  No easy bruising or bleedings:  .     PHYSICAL EXAMINATION:    GENERAL APPEARANCE:  Pt. is not currently dyspneic, in no distress. Pt. is alert, oriented, and pleasant.  HEENT:  Pupils are normal and react normally. No icterus. Mucous membranes well colored.  NECK:  Supple. No lymphadenopathy. Jugular venous pressure not elevated. Carotids equal.   HEART:   The cardiac impulse has a normal quality. There are no murmurs, rubs or gallops noted  CHEST:  Chest is clear to auscultation. Normal respiratory effort.  ABDOMEN:  Soft and nontender.   EXTREMITIES:  There is no edema.   SKIN:  No rash or significant lesions are noted.      LABS:                 13.7   9.33  )-----------( 209      ( 30 Apr 2024 08:20 )             42.2     04-30    136  |  108  |  16  ----------------------------<  152<H>  4.1   |  24  |  1.25    Ca    9.4      30 Apr 2024 08:20    TPro  7.3  /  Alb  3.6  /  TBili  0.4  /  DBili  x   /  AST  23  /  ALT  32  /  AlkPhos  82  04-30    LIVER FUNCTIONS - ( 30 Apr 2024 08:20 )  Alb: 3.6 g/dL / Pro: 7.3 gm/dL / ALK PHOS: 82 U/L / ALT: 32 U/L / AST: 23 U/L / GGT: x           PT/INR - ( 30 Apr 2024 08:20 )   PT: 11.4 sec;   INR: 1.01 ratio     PTT - ( 30 Apr 2024 08:20 )  PTT:29.9 sec    Urinalysis Basic - ( 30 Apr 2024 08:20 )    Color: x / Appearance: x / SG: x / pH: x  Gluc: 152 mg/dL / Ketone: x  / Bili: x / Urobili: x   Blood: x / Protein: x / Nitrite: x   Leuk Esterase: x / RBC: x / WBC x   Sq Epi: x / Non Sq Epi: x / Bacteria: x      CARDIAC TESTS:    < from: TTE W or WO Ultrasound Enhancing Agent (04.30.24 @ 17:07) >  CONCLUSIONS:      1. Left ventricular cavity is normal in size. Left ventricular systolic function is normal withan ejection fraction visually estimated at 60 to 65 %.   2. The left atrium is mildly dilated.   3. Mild mitral regurgitation.   4. Mild tricuspid regurgitation.   5. Moderate left ventricular hypertrophy.   6. Normal pulmonary artery pressure.      RADIOLOGY & ADDITIONAL STUDIES:    < from: MR Head No Cont (04.30.24 @ 13:36) >  FINDINGS:  There are 2 tiny punctate foci of restricted diffusion within the left   periatrial white matter at the left occipital temporal junction, and a   punctate focus of restricted diffusion in the right posterior external   capsule, compatible with small acute infarcts. Additional noncontiguous   areas of DWI hyperintense signal within the right corona radiata and left   posterior centrum semiovale without associated ADC hypointensity signal,   compatible with subacute infarcts. Numerous chronic infarcts throughout   the bilateral centrum semiovale, corona radiata, bilateral basal ganglia,   bilateral thalami, and lane are also identified. No acute hemorrhage. No   mass effect. Extensive T2/FLAIR hyperintense signal throughout the   periventricular white matter, suggestive of severe chronic microvascular   ischemic changes, appearing advanced for patient's age. Scattered areas   of susceptibility signal along the bilateral cerebri, suggestive of   punctate chronic microhemorrhages secondary to hypertension. No   hydrocephalus. The vertebral and internal carotid arteries demonstrate   expected flow voids indicating their patency.    The paranasal sinuses are clear.    IMPRESSION: 3 very small acute infarcts in the bilateral cerebri as   above. Additional subacute infarcts in the bilateral cerebri. Numerous   chronic infarcts as detailed above. Severe periventricular chronic   microvascular ischemic changes. No hemorrhage. No mass effect. Rest of   the chronic findings as above.      < from: CT Angio Neck w/ IV Cont (04.29.24 @ 15:22) >  IMPRESSION:    1.   Right carotid system:    No hemodynamically significant stenosis.    2.   Left carotid system:     No hemodynamically significant stenosis.    3.   Vertebral circulation:    Patent.    4.  Anterior intracranial circulation:     Unremarkable.    5.  Posterior intracranial circulation:    Unremarkable.    6.  No large vessel occlusion.      < from: CT Head No Cont (04.29.24 @ 13:54) >  IMPRESSION:    Apparent nonspecific decreased density within the basis pontis could   represent artifact or ischemic changes, likely chronic.    Chronic left frontal periventricular infarcts and mild white matter   microvascular ischemic disease.    No definite brain edema. No acute intracranial hemorrhage.

## 2024-05-02 NOTE — CONSULT NOTE ADULT - ASSESSMENT
57 y/o M w/ no significant PMH, p/w R facial droop. Patient states he woke up around 5:30am and went to drink water when he realized that he had R sided facial droop. Patient states he had COVID approximately 6 weeks ago. Denies any other recent infection, rash, vesicles in general areas or cold sores. Denies weakness in arms / legs. Denies CP, SOB, cough, runny nose, sore throat, nausea, vomiting, abdominal pain, fever, chills. Started on steroids/valtrex.     1. [place] palsy   - imaging reviewed  - check lyme screen, babesia pcr, ehrlichia, anaplasma pcr  - on valtrex 500mg BID x 5 days  - neuro fu noted, started on steroids  - fu brain MRI   - fu cbc  - supportive care    IV to PO abx token not applicable to this case
58 yr old male with above PMHx came to ED for right facial droop, has not seen medical doctor since 2010.  He was diagnosed with Bell's palsy, acute and chronic cerebral infarcts on MRI imaging and malignant HTN.  He has no known history of any atrial arrhythmias.  2D echo reveals normal LVEF 60-65%.    A/P: Acute and chronic CVAs, Bell's palsy  No events seen on telemetry. RBBB, LVH on EKG  LVEF 60-65%  Continue to treat HTN  Continue ASA/Plavix  Pt is on prednisone and acyclovir.  Discussed I/R/B/A of ILR implant, at this time pt declines and would rather do non-invasive measures first.  Will order 28 day MCOT with outpatient EP follow up-pt agrees.  To have outpatient ischemic w/u with cardiology.  Plan d/w patient, hospitalist MD and Dr. Alan
Mr. Owusu is a 58 year old man with no significant medical history who presented on 4/29 with right facial weakness.  He has right upper and lower facial weakness consistent with Bell's Palsy.  He was started on prednisone.  Will add valacyclovir 500 mg BID x 5 days.  Eye patch at night until he is able to fully close his eye.  Lubricating eye gel, eye drops to avoid irritation to the right eye.  f/u Lyme antibodies.  MRI brain ordered to r/o secondary causes of Bell's Palsy.

## 2024-05-02 NOTE — DISCHARGE NOTE PROVIDER - CARE PROVIDER_API CALL
Palla, Venugopal Reddy  Cardiovascular Disease  [address], [address]  [place] [address]  Phone: [phone]  Fax: [phone]  Follow Up Time: 1 week    Shanelle Mittal  Neurology  [address], [address]  [place] [address]  Phone: [phone]  Fax: [phone]  Follow Up Time: 1 month    Francisco Sandoval  [address] [address]  Phone: [phone]  Fax: [phone]  Follow Up Time: 2 weeks   Palla, Venugopal Reddy  Cardiovascular Disease  [address], [address]  [place] [address]  Phone: [phone]  Fax: [phone]  Follow Up Time: 1 week    Shanelle Mittal  Neurology  [address], [address]  [place] [address]  Phone: [phone]  Fax: [phone]  Follow Up Time: 1 month    Francisco Sandoval  [address] [address]  Phone: [phone]  Fax: [phone]  Follow Up Time: 2 weeks    Jovani Alan  Cardiac Electrophysiology  [address] [place] [address]  Phone: [phone]  Fax: [phone]  Scheduled Appointment: 06/18/2024

## 2024-05-03 LAB
A PHAGOCYTOPH DNA BLD QL NAA+PROBE: NEGATIVE — SIGNIFICANT CHANGE UP
E CHAFFEENSIS DNA BLD QL NAA+PROBE: NEGATIVE — SIGNIFICANT CHANGE UP
E EWINGII DNA SPEC QL NAA+PROBE: NEGATIVE — SIGNIFICANT CHANGE UP
EHRLICHIA DNA SPEC QL NAA+PROBE: NEGATIVE — SIGNIFICANT CHANGE UP
GLUCOSE BLDC GLUCOMTR-MCNC: 110 MG/DL — HIGH (ref 70–99)

## 2024-05-03 PROCEDURE — 99232 SBSQ HOSP IP/OBS MODERATE 35: CPT

## 2024-05-03 RX ORDER — HYDRALAZINE HCL 50 MG
50 TABLET ORAL THREE TIMES A DAY
Refills: 0 | Status: DISCONTINUED | OUTPATIENT
Start: 2024-05-03 | End: 2024-05-04

## 2024-05-03 RX ADMIN — PANTOPRAZOLE SODIUM 40 MILLIGRAM(S): 20 TABLET, DELAYED RELEASE ORAL at 05:41

## 2024-05-03 RX ADMIN — Medication 10 MILLIGRAM(S): at 05:40

## 2024-05-03 RX ADMIN — Medication 50 MILLIGRAM(S): at 22:06

## 2024-05-03 RX ADMIN — CLOPIDOGREL BISULFATE 75 MILLIGRAM(S): 75 TABLET, FILM COATED ORAL at 11:27

## 2024-05-03 RX ADMIN — VALACYCLOVIR 500 MILLIGRAM(S): 500 TABLET, FILM COATED ORAL at 13:52

## 2024-05-03 RX ADMIN — Medication 50 MILLIGRAM(S): at 11:27

## 2024-05-03 RX ADMIN — Medication 1 DROP(S): at 22:06

## 2024-05-03 RX ADMIN — Medication 2 GRAM(S): at 11:28

## 2024-05-03 RX ADMIN — AMLODIPINE BESYLATE 10 MILLIGRAM(S): 2.5 TABLET ORAL at 11:26

## 2024-05-03 RX ADMIN — Medication 81 MILLIGRAM(S): at 11:27

## 2024-05-03 RX ADMIN — Medication 1 TABLET(S): at 11:28

## 2024-05-03 RX ADMIN — Medication 500 MILLIGRAM(S): at 11:26

## 2024-05-03 RX ADMIN — ENOXAPARIN SODIUM 40 MILLIGRAM(S): 100 INJECTION SUBCUTANEOUS at 19:31

## 2024-05-03 RX ADMIN — LOSARTAN POTASSIUM 100 MILLIGRAM(S): 100 TABLET, FILM COATED ORAL at 11:28

## 2024-05-03 RX ADMIN — Medication 60 MILLIGRAM(S): at 11:29

## 2024-05-03 RX ADMIN — ATORVASTATIN CALCIUM 40 MILLIGRAM(S): 80 TABLET, FILM COATED ORAL at 22:06

## 2024-05-03 RX ADMIN — Medication 0.2 MILLIGRAM(S): at 07:45

## 2024-05-03 NOTE — PROGRESS NOTE ADULT - REASON FOR ADMISSION
R facial droop

## 2024-05-03 NOTE — PROGRESS NOTE ADULT - ASSESSMENT
59 y/o M w/ no significant PMH, p/w R facial droop. Patient states he woke up around 5:30am and went to drink water when he realized that he had R sided facial droop. Patient states he had COVID approximately 6 weeks ago. Denies any other recent infection, rash, vesicles in general areas or cold sores. Denies weakness in arms / legs. Denies CP, SOB, cough, runny nose, sore throat, nausea, vomiting, abdominal pain, fever, chills.    Pt admitted with :    *R facial droop -  [place] Palsy  -Clinical findings more indicative of [place] palsy, rather than CVA. However, will consult Neuro and will defer to neuro regarding work up for CVA   prednisone for  bells palsy  -Lyme panel   -ID consult   Acyclovir added  -Artifical eye drops   eye patch at night    * Acute CVA:  3 acute cerebral and multiple subacute infarcts  start ASA 81 mg  +Plavix 75 mg daily  echo with bubble studies  EP cardio for ILR  No A fib on tele  continue tele monitoring  control BP  ; start atorvastatin 40 mg   A1c 6.1  neuro f/u    *Bifasciular block on EKG  -Old EKG not available for comparison  -Cardio consult     *Hypertensive urgency  -S/p labetaolol in ED  started on amlodipine 10 mg,; losartan 50 mg    hydralazine iv prn  add clonidine 0.1 mg daily  up titrate meds as needed    *DVT ppx   lovenox      POC discussed with pt, team, Dr. Mittal, 
57 y/o M w/ no significant PMH, p/w R facial droop. Patient states he woke up around 5:30am and went to drink water when he realized that he had R sided facial droop. Patient states he had COVID approximately 6 weeks ago. Denies any other recent infection, rash, vesicles in general areas or cold sores. Denies weakness in arms / legs. Denies CP, SOB, cough, runny nose, sore throat, nausea, vomiting, abdominal pain, fever, chills.    Pt admitted with :    *R facial droop -  [place] Palsy  -Clinical findings more indicative of [place] palsy, rather than CVA. However, will consult Neuro and will defer to neuro regarding work up for CVA   prednisone for  bells palsy  -Lyme panel   -ID consult   Acyclovir added  -Artifical eye drops   eye patch at night    * Acute CVA:  3 acute cerebral and multiple subacute infarcts  start ASA 81 mg  +Plavix 75 mg daily  echo with bubble studies  EP cardio for ILR  No A fib on tele  continue tele monitoring  control BP  ; start atorvastatin 40 mg   A1c 6.1  neuro f/u    *Bifasciular block on EKG  -Old EKG not available for comparison  -Cardio consult     *Hypertensive urgency  -S/p labetaolol in ED    started on amlodipine 10 mg,; losartan 50 mg    hydralazine iv prn    *DVT ppx   lovenox      POC discussed with pt, team, Dr. Mittal, Dr. Palla
59 y/o M w/ no significant PMH, p/w R facial droop. Patient states he woke up around 5:30am and went to drink water when he realized that he had R sided facial droop. Patient states he had COVID approximately 6 weeks ago. Denies any other recent infection, rash, vesicles in general areas or cold sores. Denies weakness in arms / legs. Denies CP, SOB, cough, runny nose, sore throat, nausea, vomiting, abdominal pain, fever, chills.    *R facial droop -  [place] Palsy  -Continue Prednisone  -Lyme panel noted, negative  -ID consult appreciated  -Continue valacyclovir 500 mg BID for total of 5 days   -Artifical eye drops   -Eye patch at night    * Acute CVA:  3 acute cerebral and multiple subacute infarcts  start ASA 81 mg  +Plavix 75 mg daily x21 days then aspirin monotherapy   TTE with preserved LVEF 60-65%, no RWMA, mod LVH  EP consult appreciated; [facility] upon discharte   No A fib on tele  continue tele monitoring  control BP  ; start atorvastatin 40 mg   A1c 6.1  neuro f/u    *Bifasciular block on EKG  -Old EKG not available for comparison  -Cardio consult appreciated. Similar EKG in outpatient office last Friday. Outpatient ischemic workup - follow up with Dr. Palla in 1 week     *Hypertensive urgency  -S/p labetaolol in ED  started on amlodipine 10 mg, losartan 50 mg, clonidine 0.1  mg. Increased Losartan to 100 mg and Clonidine to 0.2 yesterday, however BP still uncontrolled. Discontinue Clonidine and start Hydralazine (discussed with Cardiology). Monitor BP overnight  hydralazine iv prn  up titrate meds as needed    DISPO: Pending improvement in BP, likely tomorrow 
Mr. Owusu is a 58 year old man with no significant medical history who presented on 4/29 with right facial weakness.  He has right upper and lower facial weakness consistent with Bell's Palsy.  MRI brain also showed acute, subacute and chronic infarcts.    Bell's Palsy:  -Continue prednisone  -Continue valacyclovir 500 mg BID for total of 5 days   -Eye patch at night until he is able to fully close his eye.  -Lubricating eye gel, eye drops to avoid irritation to the right eye.  -Lyme antibodies negative    Strokes:  -Three acute infarcts in different vascular territories  -Multiple chronic infarcts  -Likely had long standing issues with BP  -Aspirin + Plavix x 21 days and then aspirin monotherapy  -Continue on telemetry while inpatient. Suggest ILR and if evidence of afib will start AC.   -Statin  -Outpatient sleep study to evaluate for possible JERRY.  -DVT prophylaxis.    Will f/u as needed  
57 y/o M w/ no significant PMH, p/w R facial droop. Patient states he woke up around 5:30am and went to drink water when he realized that he had R sided facial droop. Patient states he had COVID approximately 6 weeks ago. Denies any other recent infection, rash, vesicles in general areas or cold sores. Denies weakness in arms / legs. Denies CP, SOB, cough, runny nose, sore throat, nausea, vomiting, abdominal pain, fever, chills.    *R facial droop -  [place] Palsy  -Continue Prednisone  -Lyme panel noted, negative  -ID consult appreciated  -Continue valacyclovir 500 mg BID for total of 5 days   -Artifical eye drops   -Eye patch at night    * Acute CVA:  3 acute cerebral and multiple subacute infarcts  start ASA 81 mg  +Plavix 75 mg daily x21 days then aspirin monotherapy   TTE with preserved LVEF 60-65%, no RWMA, mod LVH  EP consult appreciated; [facility] upon discharte   No A fib on tele  continue tele monitoring  control BP  ; start atorvastatin 40 mg   A1c 6.1  neuro f/u    *Bifasciular block on EKG  -Old EKG not available for comparison  -Cardio consult appreciated. Similar EKG in outpatient office last Friday. Outpatient ischemic workup - follow up with Dr. Palla in 1 week     *Hypertensive urgency  -S/p labetaolol in ED  started on amlodipine 10 mg, losartan 50 mg, clonidine 0.1  mg. BP still elevated - increase Losartan to 100 mg and Clonidine to 0.2   hydralazine iv prn  up titrate meds as needed    DISPO: Pending improvement in BP 
Mr. Owusu is a 58 year old man with no significant medical history who presented on 4/29 with right facial weakness.  He has right upper and lower facial weakness consistent with Bell's Palsy.  MRI brain also showed acute, subacute and chronic infarcts.    Bell's Palsy:  -Continue prednisone  -Continue valacyclovir 500 mg BID for total of 5 days   -Eye patch at night until he is able to fully close his eye.  -Lubricating eye gel, eye drops to avoid irritation to the right eye.  -Lyme antibodies negative    Strokes:  -Three acute infarcts in different vascular territories  -Multiple chronic infarcts  -Likely had long standing issues with BP  -Aspirin + Plavix x 21 days and then aspirin monotherapy  -Plan is for [facility] and outpatient ischemic workup.   -Statin  -Outpatient sleep study to evaluate for possible JERRY.  -DVT prophylaxis.    Please call back if additional input is needed from neurology evaluation.

## 2024-05-03 NOTE — PROGRESS NOTE ADULT - SUBJECTIVE AND OBJECTIVE BOX
HOSPITALIST PROGRESS NOTE    Admission HPI: 57 y/o M w/ no significant PMH, p/w R facial droop. Patient states he woke up around 5:30am and went to drink water when he realized that he had R sided facial droop. Patient states he had COVID approximately 6 weeks ago. Denies any other recent infection, rash, vesicles in general areas or cold sores. Denies weakness in arms / legs. Denies CP, SOB, cough, runny nose, sore throat, nausea, vomiting, abdominal pain, fever, chills.    5/2 SUBJECTIVE / INTERVAL HPI: Patient seen and examined at bedside. Overall feels that that his bell's palsy is improving, able to move is jaw more. BP still elevated - asymptomatic.     5/3: Patient seen and examined at bedside. [place] palsy stable. BP still elevated - discontinued Clonidine and started Hydralazine.     ROS: All 10 systems reviewed and found to be negative with the exception of what has been described above.     VITAL SIGNS:  Vital Signs Last 24 Hrs  T(C): 36.6 (03 May 2024 17:07), Max: 36.9 (02 May 2024 23:33)  T(F): 97.8 (03 May 2024 17:07), Max: 98.4 (02 May 2024 23:33)  HR: 79 (03 May 2024 17:07) (67 - 79)  BP: 171/79 (03 May 2024 17:07) (152/76 - 196/98)  BP(mean): --  RR: 18 (03 May 2024 17:07) (18 - 19)  SpO2: 96% (03 May 2024 17:07) (96% - 99%)    Parameters below as of 03 May 2024 17:07  Patient On (Oxygen Delivery Method): room air    PHYSICAL EXAM:  Constitutional: NAD, awake and alert, well-developed  HEENT: PERR, EOMI, MMM  Neck:  supple,  No JVD  Respiratory: Breath sounds are clear bilaterally, No wheezing, rales or rhonchi  Cardiovascular: S1 and S2, regular rate and rhythm, no Murmurs, gallops or rubs  Gastrointestinal: Bowel Sounds present, soft, nontender.   Extremities: No peripheral edema. No clubbing or cyanosis.  Vascular: 2+ peripheral pulses  Neurological: A/O x 3, right facial palsy other wise no other  focal deficits  Musculoskeletal: no calf tenderness  Skin: No rashes    MEDICATIONS:  MEDICATIONS  (STANDING):  amLODIPine   Tablet 10 milliGRAM(s) Oral daily  ascorbic acid 500 milliGRAM(s) Oral daily  aspirin  chewable 81 milliGRAM(s) Oral daily  atorvastatin 40 milliGRAM(s) Oral at bedtime  clopidogrel Tablet 75 milliGRAM(s) Oral daily  enoxaparin Injectable 40 milliGRAM(s) SubCutaneous every 24 hours  hydrALAZINE 50 milliGRAM(s) Oral three times a day  losartan 100 milliGRAM(s) Oral daily  multivitamin 1 Tablet(s) Oral daily  omega-3-Acid Ethyl Esters 2 Gram(s) Oral daily  pantoprazole    Tablet 40 milliGRAM(s) Oral before breakfast  predniSONE   Tablet 60 milliGRAM(s) Oral daily  valACYclovir 500 milliGRAM(s) Oral every 12 hours    MEDICATIONS  (PRN):  acetaminophen     Tablet .. 650 milliGRAM(s) Oral every 6 hours PRN Mild Pain (1 - 3)  artificial tears (preservative free) Ophthalmic Solution 1 Drop(s) Right EYE four times a day PRN Dry Eyes  hydrALAZINE Injectable 10 milliGRAM(s) IV Push every 6 hours PRN For SBP more than 160      ALLERGIES:  Allergies    No Known Allergies    Intolerances        LABS:              CAPILLARY BLOOD GLUCOSE      POCT Blood Glucose.: 110 mg/dL (03 May 2024 08:48)        RADIOLOGY & ADDITIONAL TESTS: Reviewed.
Interval History:  5/2/24: No new complaints. BP has still been high.    MEDICATIONS  (STANDING):  amLODIPine   Tablet 10 milliGRAM(s) Oral daily  ascorbic acid 500 milliGRAM(s) Oral daily  aspirin  chewable 81 milliGRAM(s) Oral daily  atorvastatin 40 milliGRAM(s) Oral at bedtime  cloNIDine 0.1 milliGRAM(s) Oral daily  clopidogrel Tablet 75 milliGRAM(s) Oral daily  enoxaparin Injectable 40 milliGRAM(s) SubCutaneous every 24 hours  losartan 50 milliGRAM(s) Oral daily  multivitamin 1 Tablet(s) Oral daily  omega-3-Acid Ethyl Esters 2 Gram(s) Oral daily  pantoprazole    Tablet 40 milliGRAM(s) Oral before breakfast  predniSONE   Tablet 60 milliGRAM(s) Oral daily  valACYclovir 500 milliGRAM(s) Oral every 12 hours    MEDICATIONS  (PRN):  acetaminophen     Tablet .. 650 milliGRAM(s) Oral every 6 hours PRN Mild Pain (1 - 3)  artificial tears (preservative free) Ophthalmic Solution 1 Drop(s) Right EYE four times a day PRN Dry Eyes  hydrALAZINE Injectable 10 milliGRAM(s) IV Push every 6 hours PRN For SBP more than 160      Allergies    No Known Allergies    Intolerances        PHYSICAL EXAM:  Vital Signs Last 24 Hrs  T(F): 98.5 (05-02-24 @ 08:54)  HR: 65 (05-02-24 @ 08:54)  BP: 190/97 (05-02-24 @ 08:54)  RR: 16 (05-02-24 @ 08:54)      GENERAL: NAD, well-groomed, well-developed  HEAD:  Atraumatic, Normocephalic  Neuro:  Awake, alert, no aphasia  CN: PERRL, EOMI, no nystagmus, Right upper and lower facial weakness, cannot close right eye against resistance,  tongue protrudes in the midline  motor: normal tone, no pronator drift, full strength in all four extremities  sensory: intact to light touch  coordination: finger to nose intact bilaterally  gait: not tested      LABS:      RADIOLOGY & ADDITIONAL STUDIES:    CT head 4/29/24:  Apparent nonspecific decreased density within the basis pontis could   represent artifact or ischemic changes, likely chronic.    Chronic left frontal periventricular infarcts and mild white matter   microvascular ischemic disease.    No definite brain edema. No acute intracranial hemorrhage.    CTA head and neck 4/29/24:  1.   Right carotid system:    No hemodynamically significant stenosis.  2.   Left carotid system:     No hemodynamically significant stenosis.  3.   Vertebral circulation:    Patent.  4.  Anterior intracranial circulation:     Unremarkable.  5.  Posterior intracranial circulation:    Unremarkable.  6.  No large vessel occlusion.    MRI head 4/30/24:  3 very small acute infarcts in the bilateral cerebri (left periatrial white matter, left occipital temporal junction, right posterior external capsule)  Additional subacute infarcts in the bilateral cerebri.   Numerous chronic infarcts as detailed above ( bilateral centrum semiovale, corona radiata, bilateral basal ganglia, bilateral thalami, and lane)  Severe periventricular chronic microvascular ischemic changes.   No hemorrhage. No mass effect.               
HOSPITALIST PROGRESS NOTE    Admission HPI: 57 y/o M w/ no significant PMH, p/w R facial droop. Patient states he woke up around 5:30am and went to drink water when he realized that he had R sided facial droop. Patient states he had COVID approximately 6 weeks ago. Denies any other recent infection, rash, vesicles in general areas or cold sores. Denies weakness in arms / legs. Denies CP, SOB, cough, runny nose, sore throat, nausea, vomiting, abdominal pain, fever, chills.    5/2 SUBJECTIVE / INTERVAL HPI: Patient seen and examined at bedside. Overall feels that that his bell's palsy is improving, able to move is jaw more. BP still elevated - asymptomatic.     ROS: All 10 systems reviewed and found to be negative with the exception of what has been described above.     VITAL SIGNS:  Vital Signs Last 24 Hrs  T(C): 36.9 (02 May 2024 08:54), Max: 37.1 (01 May 2024 21:46)  T(F): 98.5 (02 May 2024 08:54), Max: 98.8 (01 May 2024 21:46)  HR: 65 (02 May 2024 08:54) (65 - 80)  BP: 168/73 (02 May 2024 13:54) (160/98 - 190/97)  BP(mean): --  RR: 16 (02 May 2024 08:54) (16 - 18)  SpO2: 93% (02 May 2024 08:54) (93% - 96%)    Parameters below as of 02 May 2024 08:54  Patient On (Oxygen Delivery Method): room air    PHYSICAL EXAM:  Constitutional: NAD, awake and alert, well-developed  HEENT: PERR, EOMI, MMM  Neck:  supple,  No JVD  Respiratory: Breath sounds are clear bilaterally, No wheezing, rales or rhonchi  Cardiovascular: S1 and S2, regular rate and rhythm, no Murmurs, gallops or rubs  Gastrointestinal: Bowel Sounds present, soft, nontender.   Extremities: No peripheral edema. No clubbing or cyanosis.  Vascular: 2+ peripheral pulses  Neurological: A/O x 3, right facial palsy other wise no other  focal deficits  Musculoskeletal: no calf tenderness  Skin: No rashes    MEDICATIONS:  MEDICATIONS  (STANDING):  amLODIPine   Tablet 10 milliGRAM(s) Oral daily  ascorbic acid 500 milliGRAM(s) Oral daily  aspirin  chewable 81 milliGRAM(s) Oral daily  atorvastatin 40 milliGRAM(s) Oral at bedtime  cloNIDine 0.2 milliGRAM(s) Oral daily  clopidogrel Tablet 75 milliGRAM(s) Oral daily  enoxaparin Injectable 40 milliGRAM(s) SubCutaneous every 24 hours  losartan 100 milliGRAM(s) Oral daily  multivitamin 1 Tablet(s) Oral daily  omega-3-Acid Ethyl Esters 2 Gram(s) Oral daily  pantoprazole    Tablet 40 milliGRAM(s) Oral before breakfast  predniSONE   Tablet 60 milliGRAM(s) Oral daily  valACYclovir 500 milliGRAM(s) Oral every 12 hours    MEDICATIONS  (PRN):  acetaminophen     Tablet .. 650 milliGRAM(s) Oral every 6 hours PRN Mild Pain (1 - 3)  artificial tears (preservative free) Ophthalmic Solution 1 Drop(s) Right EYE four times a day PRN Dry Eyes  hydrALAZINE Injectable 10 milliGRAM(s) IV Push every 6 hours PRN For SBP more than 160      ALLERGIES:  Allergies    No Known Allergies    Intolerances        LABS:              CAPILLARY BLOOD GLUCOSE            RADIOLOGY & ADDITIONAL TESTS: Reviewed.
4/30: no complaints  right facial droop  MRI + ve for 3 acute infarcts and multiple subacute infarcts  BP elevated    5/1: no complaints  BP uncontrolled  add clonidine 0.1 mg daily    PHYSICAL EXAM:    Vital Signs Last 24 Hrs  T(C): 36.7 (01 May 2024 15:45), Max: 36.8 (30 Apr 2024 20:43)  T(F): 98.1 (01 May 2024 15:45), Max: 98.2 (30 Apr 2024 20:43)  HR: 85 (01 May 2024 15:45) (73 - 92)  BP: 182/88 (01 May 2024 15:45) (139/74 - 186/93)  BP(mean): 92 (30 Apr 2024 23:33) (92 - 92)  RR: 18 (01 May 2024 15:45) (17 - 18)  SpO2: 95% (01 May 2024 15:45) (95% - 98%)    Parameters below as of 01 May 2024 15:45  Patient On (Oxygen Delivery Method): room air      Constitutional: Weak appearing  HEENT: Atraumatic, TROY,   Respiratory: Breath Sounds normal, no rhonchi/wheeze  Cardiovascular: N S1S2;   Gastrointestinal: Abdomen soft, non tender, Bowel Sounds present  Extremities: No edema, peripheral pulses present  Neurological: AAO x 3, right facial droop and weakness of both upper and lower areas  Skin: Non cellulitic, no rash, ulcers  Lymph Nodes: No lymphadenopathy noted  Back: No CVA tenderness   Musculoskeletal: non tender  Breasts: Deferred  Genitourinary: deferred  Rectal: Deferred                              13.7   9.33  )-----------( 209      ( 30 Apr 2024 08:20 )             42.2       CBC Full  -  ( 30 Apr 2024 08:20 )  WBC Count : 9.33 K/uL  RBC Count : 4.92 M/uL  Hemoglobin : 13.7 g/dL  Hematocrit : 42.2 %  Platelet Count - Automated : 209 K/uL  Mean Cell Volume : 85.8 fl  Mean Cell Hemoglobin : 27.8 pg  Mean Cell Hemoglobin Concentration : 32.5 gm/dL  Auto Neutrophil # : 7.88 K/uL  Auto Lymphocyte # : 0.91 K/uL  Auto Monocyte # : 0.48 K/uL  Auto Eosinophil # : 0.00 K/uL  Auto Basophil # : 0.02 K/uL  Auto Neutrophil % : 84.5 %  Auto Lymphocyte % : 9.8 %  Auto Monocyte % : 5.1 %  Auto Eosinophil % : 0.0 %  Auto Basophil % : 0.2 %      04-30    136  |  108  |  16  ----------------------------<  152<H>  4.1   |  24  |  1.25    Ca    9.4      30 Apr 2024 08:20    TPro  7.3  /  Alb  3.6  /  TBili  0.4  /  DBili  x   /  AST  23  /  ALT  32  /  AlkPhos  82  04-30      LIVER FUNCTIONS - ( 30 Apr 2024 08:20 )  Alb: 3.6 g/dL / Pro: 7.3 gm/dL / ALK PHOS: 82 U/L / ALT: 32 U/L / AST: 23 U/L / GGT: x             PT/INR - ( 30 Apr 2024 08:20 )   PT: 11.4 sec;   INR: 1.01 ratio         PTT - ( 30 Apr 2024 08:20 )  PTT:29.9 sec          Urinalysis Basic - ( 30 Apr 2024 08:20 )    Color: x / Appearance: x / SG: x / pH: x  Gluc: 152 mg/dL / Ketone: x  / Bili: x / Urobili: x   Blood: x / Protein: x / Nitrite: x   Leuk Esterase: x / RBC: x / WBC x   Sq Epi: x / Non Sq Epi: x / Bacteria: x      < from: MR Head No Cont (04.30.24 @ 13:36) >  IMPRESSION: 3 very small acute infarcts in the bilateral cerebri as   above. Additional subacute infarcts in the bilateral cerebri. Numerous   chronic infarcts as detailed above. Severe periventricular chronic   microvascular ischemic changes. No hemorrhage. No mass effect. Rest of   the chronic findings as above.    < end of copied text >  < from: CT Angio Neck w/ IV Cont (04.29.24 @ 15:22) >  IMPRESSION:    1.   Right carotid system:    No hemodynamically significant stenosis.    2.   Left carotid system:     No hemodynamically significant stenosis.    3.   Vertebral circulation:    Patent.    4.  Anterior intracranial circulation:     Unremarkable.    5.  Posterior intracranial circulation:    Unremarkable.    6.  No large vessel occlusion.    < end of copied text >  < from: Xray Chest 2 Views PA/Lat (04.29.24 @ 15:10) >  IMPRESSION: Negative chest.    < end of copied text >  < from: CT Head No Cont (04.29.24 @ 13:54) >  IMPRESSION:    Apparent nonspecific decreased density within the basis pontis could   represent artifact or ischemic changes, likely chronic.    Chronic left frontal periventricular infarcts and mild white matter   microvascular ischemic disease.    No definite brain edema. No acute intracranial hemorrhage.    < end of copied text >        MEDICATIONS  (STANDING):  ascorbic acid 500 milliGRAM(s) Oral daily  aspirin  chewable 81 milliGRAM(s) Oral daily  clopidogrel Tablet 75 milliGRAM(s) Oral daily  enoxaparin Injectable 40 milliGRAM(s) SubCutaneous every 24 hours  multivitamin 1 Tablet(s) Oral daily  omega-3-Acid Ethyl Esters 2 Gram(s) Oral daily  pantoprazole    Tablet 40 milliGRAM(s) Oral before breakfast  predniSONE   Tablet 60 milliGRAM(s) Oral daily  valACYclovir 500 milliGRAM(s) Oral every 12 hours    MEDICATIONS  (PRN):  acetaminophen     Tablet .. 650 milliGRAM(s) Oral every 6 hours PRN Mild Pain (1 - 3)  artificial tears (preservative free) Ophthalmic Solution 1 Drop(s) Right EYE four times a day PRN Dry Eyes  hydrALAZINE Injectable 10 milliGRAM(s) IV Push every 6 hours PRN For SBP more than 160    
4/30: no complaints  right facial droop  MRI + ve for 3 acute infarcts and multiple subacute infarcts  BP elevated    PHYSICAL EXAM:    Vital Signs Last 24 Hrs  T(C): 36.6 (30 Apr 2024 14:46), Max: 36.8 (29 Apr 2024 23:38)  T(F): 97.8 (30 Apr 2024 14:46), Max: 98.2 (29 Apr 2024 23:38)  HR: 84 (30 Apr 2024 14:46) (74 - 134)  BP: 202/108 (30 Apr 2024 14:46) (175/97 - 213/114)  BP(mean): 127 (30 Apr 2024 01:10) (125 - 127)  RR: 18 (30 Apr 2024 14:46) (16 - 18)  SpO2: 97% (30 Apr 2024 14:46) (96% - 98%)    Constitutional: Weak appearing  HEENT: Atraumatic, TROY,   Respiratory: Breath Sounds normal, no rhonchi/wheeze  Cardiovascular: N S1S2;   Gastrointestinal: Abdomen soft, non tender, Bowel Sounds present  Extremities: No edema, peripheral pulses present  Neurological: AAO x 3, right facial droop and weakness of both upper and lower areas  Skin: Non cellulitic, no rash, ulcers  Lymph Nodes: No lymphadenopathy noted  Back: No CVA tenderness   Musculoskeletal: non tender  Breasts: Deferred  Genitourinary: deferred  Rectal: Deferred    All Labs/EKG/Radiology/Meds reviewed by me                          13.7   9.33  )-----------( 209      ( 30 Apr 2024 08:20 )             42.2       CBC Full  -  ( 30 Apr 2024 08:20 )  WBC Count : 9.33 K/uL  RBC Count : 4.92 M/uL  Hemoglobin : 13.7 g/dL  Hematocrit : 42.2 %  Platelet Count - Automated : 209 K/uL  Mean Cell Volume : 85.8 fl  Mean Cell Hemoglobin : 27.8 pg  Mean Cell Hemoglobin Concentration : 32.5 gm/dL  Auto Neutrophil # : 7.88 K/uL  Auto Lymphocyte # : 0.91 K/uL  Auto Monocyte # : 0.48 K/uL  Auto Eosinophil # : 0.00 K/uL  Auto Basophil # : 0.02 K/uL  Auto Neutrophil % : 84.5 %  Auto Lymphocyte % : 9.8 %  Auto Monocyte % : 5.1 %  Auto Eosinophil % : 0.0 %  Auto Basophil % : 0.2 %      04-30    136  |  108  |  16  ----------------------------<  152<H>  4.1   |  24  |  1.25    Ca    9.4      30 Apr 2024 08:20    TPro  7.3  /  Alb  3.6  /  TBili  0.4  /  DBili  x   /  AST  23  /  ALT  32  /  AlkPhos  82  04-30      LIVER FUNCTIONS - ( 30 Apr 2024 08:20 )  Alb: 3.6 g/dL / Pro: 7.3 gm/dL / ALK PHOS: 82 U/L / ALT: 32 U/L / AST: 23 U/L / GGT: x             PT/INR - ( 30 Apr 2024 08:20 )   PT: 11.4 sec;   INR: 1.01 ratio         PTT - ( 30 Apr 2024 08:20 )  PTT:29.9 sec          Urinalysis Basic - ( 30 Apr 2024 08:20 )    Color: x / Appearance: x / SG: x / pH: x  Gluc: 152 mg/dL / Ketone: x  / Bili: x / Urobili: x   Blood: x / Protein: x / Nitrite: x   Leuk Esterase: x / RBC: x / WBC x   Sq Epi: x / Non Sq Epi: x / Bacteria: x      < from: MR Head No Cont (04.30.24 @ 13:36) >  IMPRESSION: 3 very small acute infarcts in the bilateral cerebri as   above. Additional subacute infarcts in the bilateral cerebri. Numerous   chronic infarcts as detailed above. Severe periventricular chronic   microvascular ischemic changes. No hemorrhage. No mass effect. Rest of   the chronic findings as above.    < end of copied text >  < from: CT Angio Neck w/ IV Cont (04.29.24 @ 15:22) >  IMPRESSION:    1.   Right carotid system:    No hemodynamically significant stenosis.    2.   Left carotid system:     No hemodynamically significant stenosis.    3.   Vertebral circulation:    Patent.    4.  Anterior intracranial circulation:     Unremarkable.    5.  Posterior intracranial circulation:    Unremarkable.    6.  No large vessel occlusion.    < end of copied text >  < from: Xray Chest 2 Views PA/Lat (04.29.24 @ 15:10) >  IMPRESSION: Negative chest.    < end of copied text >  < from: CT Head No Cont (04.29.24 @ 13:54) >  IMPRESSION:    Apparent nonspecific decreased density within the basis pontis could   represent artifact or ischemic changes, likely chronic.    Chronic left frontal periventricular infarcts and mild white matter   microvascular ischemic disease.    No definite brain edema. No acute intracranial hemorrhage.    < end of copied text >        MEDICATIONS  (STANDING):  ascorbic acid 500 milliGRAM(s) Oral daily  aspirin  chewable 81 milliGRAM(s) Oral daily  clopidogrel Tablet 75 milliGRAM(s) Oral daily  enoxaparin Injectable 40 milliGRAM(s) SubCutaneous every 24 hours  multivitamin 1 Tablet(s) Oral daily  omega-3-Acid Ethyl Esters 2 Gram(s) Oral daily  pantoprazole    Tablet 40 milliGRAM(s) Oral before breakfast  predniSONE   Tablet 60 milliGRAM(s) Oral daily  valACYclovir 500 milliGRAM(s) Oral every 12 hours    MEDICATIONS  (PRN):  acetaminophen     Tablet .. 650 milliGRAM(s) Oral every 6 hours PRN Mild Pain (1 - 3)  artificial tears (preservative free) Ophthalmic Solution 1 Drop(s) Right EYE four times a day PRN Dry Eyes  hydrALAZINE Injectable 10 milliGRAM(s) IV Push every 6 hours PRN For SBP more than 160    
Interval History:  5/1/24: He has no complaints today. BP remains elevated.     MEDICATIONS  (STANDING):  amLODIPine   Tablet 10 milliGRAM(s) Oral daily  ascorbic acid 500 milliGRAM(s) Oral daily  aspirin  chewable 81 milliGRAM(s) Oral daily  atorvastatin 40 milliGRAM(s) Oral at bedtime  clopidogrel Tablet 75 milliGRAM(s) Oral daily  enoxaparin Injectable 40 milliGRAM(s) SubCutaneous every 24 hours  losartan 50 milliGRAM(s) Oral daily  multivitamin 1 Tablet(s) Oral daily  omega-3-Acid Ethyl Esters 2 Gram(s) Oral daily  pantoprazole    Tablet 40 milliGRAM(s) Oral before breakfast  predniSONE   Tablet 60 milliGRAM(s) Oral daily  valACYclovir 500 milliGRAM(s) Oral every 12 hours    MEDICATIONS  (PRN):  acetaminophen     Tablet .. 650 milliGRAM(s) Oral every 6 hours PRN Mild Pain (1 - 3)  artificial tears (preservative free) Ophthalmic Solution 1 Drop(s) Right EYE four times a day PRN Dry Eyes  hydrALAZINE Injectable 10 milliGRAM(s) IV Push every 6 hours PRN For SBP more than 160      Allergies    No Known Allergies    Intolerances        PHYSICAL EXAM:  Vital Signs Last 24 Hrs  T(F): 98.1 (05-01-24 @ 09:26)  HR: 76 (05-01-24 @ 09:26)  BP: 184/94 (05-01-24 @ 09:26)  RR: 17 (05-01-24 @ 09:26)    GENERAL: NAD, well-groomed, well-developed  HEAD:  Atraumatic, Normocephalic  Neuro:  Awake, alert, no aphasia  CN: PERRL, EOMI, no nystagmus, Right upper and lower facial weakness, cannot close right eye against resistance,  tongue protrudes in the midline  motor: normal tone, no pronator drift, full strength in all four extremities  sensory: intact to light touch  coordination: finger to nose intact bilaterally  gait: not tested    LABS:                        13.7   9.33  )-----------( 209      ( 30 Apr 2024 08:20 )             42.2     04-30    136  |  108  |  16  ----------------------------<  152<H>  4.1   |  24  |  1.25    Ca    9.4      30 Apr 2024 08:20    TPro  7.3  /  Alb  3.6  /  TBili  0.4  /  DBili  x   /  AST  23  /  ALT  32  /  AlkPhos  82  04-30    PT/INR - ( 30 Apr 2024 08:20 )   PT: 11.4 sec;   INR: 1.01 ratio         PTT - ( 30 Apr 2024 08:20 )  PTT:29.9 sec  Urinalysis Basic - ( 30 Apr 2024 08:20 )    Color: x / Appearance: x / SG: x / pH: x  Gluc: 152 mg/dL / Ketone: x  / Bili: x / Urobili: x   Blood: x / Protein: x / Nitrite: x   Leuk Esterase: x / RBC: x / WBC x   Sq Epi: x / Non Sq Epi: x / Bacteria: x        RADIOLOGY & ADDITIONAL STUDIES:    CT head 4/29/24:  Apparent nonspecific decreased density within the basis pontis could   represent artifact or ischemic changes, likely chronic.    Chronic left frontal periventricular infarcts and mild white matter   microvascular ischemic disease.    No definite brain edema. No acute intracranial hemorrhage.    CTA head and neck 4/29/24:  1.   Right carotid system:    No hemodynamically significant stenosis.  2.   Left carotid system:     No hemodynamically significant stenosis.  3.   Vertebral circulation:    Patent.  4.  Anterior intracranial circulation:     Unremarkable.  5.  Posterior intracranial circulation:    Unremarkable.  6.  No large vessel occlusion.    MRI head 4/30/24:  3 very small acute infarcts in the bilateral cerebri (left periatrial white matter, left occipital temporal junction, right posterior external capsule)  Additional subacute infarcts in the bilateral cerebri.   Numerous chronic infarcts as detailed above ( bilateral centrum semiovale, corona radiata, bilateral basal ganglia, bilateral thalami, and lane)  Severe periventricular chronic microvascular ischemic changes.   No hemorrhage. No mass effect.         
    CHIEF COMPLAINT: right fascial droop     HPI:  57 y/o M w/ no significant PMH, p/w R facial droop. Patient states he woke up around 5:30am and went to drink water when he realized that he had R sided facial droop. Patient states he had COVID approximately 6 weeks ago. Denies any other recent infection, rash, vesicles in general areas or cold sores. Denies weakness in arms / legs. Denies CP, SOB, cough, runny nose, sore throat, nausea, vomiting, abdominal pain, fever, chills   called for cardiology evaluation as patient was noted to have abnormal ekg , Patient says he did not see any physician since 2010 , until last friday   he saw primary care for first time , patient is morbidly obese ,  denies any chest pain or shortness of breath , does exercise on stationary bike ,   his blood pressure was markedly elevated in ER  persistently elevated     patient not checking his BP at home     5/1/24: megha in bed, no complaints, MRI head - multiple new and old infarcts     MEDICATIONS:  MEDICATIONS  (STANDING):  amLODIPine   Tablet 10 milliGRAM(s) Oral daily  ascorbic acid 500 milliGRAM(s) Oral daily  aspirin  chewable 81 milliGRAM(s) Oral daily  atorvastatin 40 milliGRAM(s) Oral at bedtime  clopidogrel Tablet 75 milliGRAM(s) Oral daily  enoxaparin Injectable 40 milliGRAM(s) SubCutaneous every 24 hours  losartan 50 milliGRAM(s) Oral daily  multivitamin 1 Tablet(s) Oral daily  omega-3-Acid Ethyl Esters 2 Gram(s) Oral daily  pantoprazole    Tablet 40 milliGRAM(s) Oral before breakfast  predniSONE   Tablet 60 milliGRAM(s) Oral daily  valACYclovir 500 milliGRAM(s) Oral every 12 hours    MEDICATIONS  (PRN):  acetaminophen     Tablet .. 650 milliGRAM(s) Oral every 6 hours PRN Mild Pain (1 - 3)  artificial tears (preservative free) Ophthalmic Solution 1 Drop(s) Right EYE four times a day PRN Dry Eyes  hydrALAZINE Injectable 10 milliGRAM(s) IV Push every 6 hours PRN For SBP more than 160      Vital Signs Last 24 Hrs  T(C): 36.7 (01 May 2024 09:26), Max: 36.8 (30 Apr 2024 20:43)  T(F): 98.1 (01 May 2024 09:26), Max: 98.2 (30 Apr 2024 20:43)  HR: 76 (01 May 2024 09:26) (73 - 92)  BP: 162/78 (01 May 2024 11:28) (139/74 - 213/114)  BP(mean): 92 (30 Apr 2024 23:33) (92 - 92)  RR: 17 (01 May 2024 09:26) (17 - 18)  SpO2: 98% (01 May 2024 09:26) (96% - 98%)    Parameters below as of 01 May 2024 09:26  Patient On (Oxygen Delivery Method): room air      PHYSICAL EXAM:    Constitutional: NAD, awake and alert, well-developed  HEENT: PERR, EOMI,  No oral cyananosis.  Neck:  supple,  No JVD  Respiratory: Breath sounds are clear bilaterally, No wheezing, rales or rhonchi  Cardiovascular: S1 and S2, regular rate and rhythm, no Murmurs, gallops or rubs  Gastrointestinal: Bowel Sounds present, soft, nontender.   Extremities: No peripheral edema. No clubbing or cyanosis.  Vascular: 2+ peripheral pulses  Neurological: A/O x 3, right facial palsy other wise no other  focal deficits  Musculoskeletal: no calf tenderness.  Skin: No rashes.      LABS: All Labs Reviewed:                                  13.7   9.33  )-----------( 209      ( 30 Apr 2024 08:20 )             42.2     04-30    136  |  108  |  16  ----------------------------<  152<H>  4.1   |  24  |  1.25    Ca    9.4      30 Apr 2024 08:20    TPro  7.3  /  Alb  3.6  /  TBili  0.4  /  DBili  x   /  AST  23  /  ALT  32  /  AlkPhos  82  04-30    - TroponinI hsT: <-27.96    RADIOLOGY/EKG/TTE: reviewed     < from: TTE W or WO Ultrasound Enhancing Agent (04.30.24 @ 17:07) >  _______________________________________________________________________________________     CONCLUSIONS:      1. Left ventricular cavity is normal in size. Left ventricular systolic function is normal withan ejection fraction visually estimated at 60 to 65 %.   2. The left atrium is mildly dilated.   3. Mild mitral regurgitation.   4. Mild tricuspid regurgitation.   5. Moderate left ventricular hypertrophy.   6. Normal pulmonary artery pressure.    _______________________________________________________________    < end of copied text >      < from: 12 Lead ECG (04.29.24 @ 14:22) >    Diagnosis Line Normal sinus rhythm  Right bundle branch block  Left anterior fascicular block  *** Bifascicular block ***  Moderate voltage criteria for LVH, may be normal variant  Abnormal ECG  No previous ECGs available  Confirmed by TICO WASHINGTON PAUL ([id number]) on 4/29/2024 3:46:09 PM    < end of copied text >

## 2024-05-04 ENCOUNTER — TRANSCRIPTION ENCOUNTER (OUTPATIENT)
Age: 59
End: 2024-05-04

## 2024-05-04 VITALS — DIASTOLIC BLOOD PRESSURE: 85 MMHG | HEART RATE: 95 BPM | SYSTOLIC BLOOD PRESSURE: 153 MMHG

## 2024-05-04 PROCEDURE — 99239 HOSP IP/OBS DSCHRG MGMT >30: CPT

## 2024-05-04 RX ORDER — PANTOPRAZOLE SODIUM 20 MG/1
1 TABLET, DELAYED RELEASE ORAL
Qty: 5 | Refills: 0
Start: 2024-05-04 | End: 2024-05-08

## 2024-05-04 RX ORDER — LOSARTAN POTASSIUM 100 MG/1
1 TABLET, FILM COATED ORAL
Qty: 30 | Refills: 0
Start: 2024-05-04 | End: 2024-06-02

## 2024-05-04 RX ORDER — CLOPIDOGREL BISULFATE 75 MG/1
1 TABLET, FILM COATED ORAL
Qty: 16 | Refills: 0
Start: 2024-05-04 | End: 2024-05-19

## 2024-05-04 RX ORDER — AMLODIPINE BESYLATE 2.5 MG/1
1 TABLET ORAL
Qty: 30 | Refills: 0
Start: 2024-05-04 | End: 2024-06-02

## 2024-05-04 RX ORDER — ASPIRIN/CALCIUM CARB/MAGNESIUM 324 MG
1 TABLET ORAL
Qty: 30 | Refills: 0
Start: 2024-05-04 | End: 2024-06-02

## 2024-05-04 RX ORDER — HYDRALAZINE HCL 50 MG
1 TABLET ORAL
Qty: 90 | Refills: 0
Start: 2024-05-04 | End: 2024-06-02

## 2024-05-04 RX ORDER — HYDRALAZINE HCL 50 MG
1 TABLET ORAL
Qty: 30 | Refills: 0
Start: 2024-05-04 | End: 2024-06-02

## 2024-05-04 RX ORDER — ATORVASTATIN CALCIUM 80 MG/1
1 TABLET, FILM COATED ORAL
Qty: 30 | Refills: 0
Start: 2024-05-04 | End: 2024-06-02

## 2024-05-04 RX ADMIN — Medication 1 TABLET(S): at 09:08

## 2024-05-04 RX ADMIN — Medication 500 MILLIGRAM(S): at 09:09

## 2024-05-04 RX ADMIN — VALACYCLOVIR 500 MILLIGRAM(S): 500 TABLET, FILM COATED ORAL at 09:09

## 2024-05-04 RX ADMIN — Medication 60 MILLIGRAM(S): at 09:09

## 2024-05-04 RX ADMIN — Medication 2 GRAM(S): at 09:08

## 2024-05-04 RX ADMIN — LOSARTAN POTASSIUM 100 MILLIGRAM(S): 100 TABLET, FILM COATED ORAL at 09:09

## 2024-05-04 RX ADMIN — AMLODIPINE BESYLATE 10 MILLIGRAM(S): 2.5 TABLET ORAL at 09:08

## 2024-05-04 RX ADMIN — VALACYCLOVIR 500 MILLIGRAM(S): 500 TABLET, FILM COATED ORAL at 00:18

## 2024-05-04 RX ADMIN — Medication 50 MILLIGRAM(S): at 06:03

## 2024-05-04 RX ADMIN — Medication 50 MILLIGRAM(S): at 13:20

## 2024-05-04 RX ADMIN — CLOPIDOGREL BISULFATE 75 MILLIGRAM(S): 75 TABLET, FILM COATED ORAL at 13:21

## 2024-05-04 RX ADMIN — Medication 81 MILLIGRAM(S): at 09:09

## 2024-05-04 RX ADMIN — PANTOPRAZOLE SODIUM 40 MILLIGRAM(S): 20 TABLET, DELAYED RELEASE ORAL at 06:03

## 2024-05-04 NOTE — DISCHARGE NOTE NURSING/CASE MANAGEMENT/SOCIAL WORK - PATIENT PORTAL LINK FT
You can access the FollowMyHealth Patient Portal offered by [facility] by registering at the following website: [email or web address]. By joining [facility]’s FollowMyHealth portal, you will also be able to view your health information using other applications (apps) compatible with our system.

## 2024-05-10 DIAGNOSIS — I16.0 HYPERTENSIVE URGENCY: ICD-10-CM

## 2024-05-10 DIAGNOSIS — I63.9 CEREBRAL INFARCTION, UNSPECIFIED: ICD-10-CM

## 2024-05-10 DIAGNOSIS — I10 ESSENTIAL (PRIMARY) HYPERTENSION: ICD-10-CM

## 2024-05-10 DIAGNOSIS — I45.2 BIFASCICULAR BLOCK: ICD-10-CM

## 2024-05-10 DIAGNOSIS — G51.0 BELL'S PALSY: ICD-10-CM

## 2024-05-10 DIAGNOSIS — E66.01 MORBID (SEVERE) OBESITY DUE TO EXCESS CALORIES: ICD-10-CM

## 2024-05-10 DIAGNOSIS — Z87.891 PERSONAL HISTORY OF NICOTINE DEPENDENCE: ICD-10-CM

## 2024-06-14 ENCOUNTER — OFFICE (OUTPATIENT)
Dept: URBAN - METROPOLITAN AREA CLINIC 102 | Facility: CLINIC | Age: 59
Setting detail: OPHTHALMOLOGY
End: 2024-06-14
Payer: COMMERCIAL

## 2024-06-14 DIAGNOSIS — H25.13: ICD-10-CM

## 2024-06-14 DIAGNOSIS — G51.0: ICD-10-CM

## 2024-06-14 DIAGNOSIS — H16.223: ICD-10-CM

## 2024-06-14 DIAGNOSIS — D31.32: ICD-10-CM

## 2024-06-14 DIAGNOSIS — H35.033: ICD-10-CM

## 2024-06-14 PROCEDURE — 99204 OFFICE O/P NEW MOD 45 MIN: CPT | Performed by: STUDENT IN AN ORGANIZED HEALTH CARE EDUCATION/TRAINING PROGRAM

## 2024-06-14 PROCEDURE — 92250 FUNDUS PHOTOGRAPHY W/I&R: CPT | Performed by: STUDENT IN AN ORGANIZED HEALTH CARE EDUCATION/TRAINING PROGRAM

## 2024-06-14 ASSESSMENT — CONFRONTATIONAL VISUAL FIELD TEST (CVF)
OS_FINDINGS: FULL
OD_FINDINGS: FULL

## 2024-06-15 PROBLEM — G51.0 BELLS PALSY ; LEFT EYE: Status: ACTIVE | Noted: 2024-06-14

## 2024-06-17 DIAGNOSIS — Z87.891 PERSONAL HISTORY OF NICOTINE DEPENDENCE: ICD-10-CM

## 2024-06-17 DIAGNOSIS — I10 ESSENTIAL (PRIMARY) HYPERTENSION: ICD-10-CM

## 2024-06-17 DIAGNOSIS — E78.5 HYPERLIPIDEMIA, UNSPECIFIED: ICD-10-CM

## 2024-06-17 DIAGNOSIS — Z82.49 FAMILY HISTORY OF ISCHEMIC HEART DISEASE AND OTHER DISEASES OF THE CIRCULATORY SYSTEM: ICD-10-CM

## 2024-06-17 DIAGNOSIS — Z80.0 FAMILY HISTORY OF MALIGNANT NEOPLASM OF DIGESTIVE ORGANS: ICD-10-CM

## 2024-06-17 DIAGNOSIS — Z78.9 OTHER SPECIFIED HEALTH STATUS: ICD-10-CM

## 2024-06-17 RX ORDER — HYDRALAZINE HYDROCHLORIDE 50 MG/1
50 TABLET ORAL 3 TIMES DAILY
Refills: 0 | Status: ACTIVE | COMMUNITY
Start: 2024-06-17

## 2024-06-17 RX ORDER — ASPIRIN 81 MG/1
81 TABLET, CHEWABLE ORAL
Refills: 0 | Status: ACTIVE | COMMUNITY
Start: 2024-06-17

## 2024-06-17 RX ORDER — ELECTROLYTES/DEXTROSE
SOLUTION, ORAL ORAL DAILY
Refills: 0 | Status: ACTIVE | COMMUNITY
Start: 2024-06-17

## 2024-06-17 RX ORDER — ASCORBIC ACID 500 MG
500 TABLET ORAL DAILY
Refills: 0 | Status: ACTIVE | COMMUNITY
Start: 2024-06-17

## 2024-06-17 RX ORDER — AMLODIPINE BESYLATE 10 MG/1
10 TABLET ORAL
Refills: 0 | Status: ACTIVE | COMMUNITY
Start: 2024-06-17

## 2024-06-17 RX ORDER — OMEGA-3/DHA/EPA/FISH OIL 300-1000MG
1000 CAPSULE ORAL DAILY
Refills: 0 | Status: ACTIVE | COMMUNITY
Start: 2024-06-17

## 2024-06-17 RX ORDER — ATORVASTATIN CALCIUM 40 MG/1
40 TABLET, FILM COATED ORAL
Refills: 0 | Status: ACTIVE | COMMUNITY
Start: 2024-06-17

## 2024-06-17 RX ORDER — LOSARTAN POTASSIUM 100 MG/1
100 TABLET, FILM COATED ORAL DAILY
Refills: 0 | Status: ACTIVE | COMMUNITY
Start: 2024-06-17

## 2024-06-18 ENCOUNTER — APPOINTMENT (OUTPATIENT)
Dept: ELECTROPHYSIOLOGY | Facility: CLINIC | Age: 59
End: 2024-06-18

## 2024-06-18 ENCOUNTER — NON-APPOINTMENT (OUTPATIENT)
Age: 59
End: 2024-06-18

## 2024-06-18 VITALS
WEIGHT: 315 LBS | OXYGEN SATURATION: 98 % | RESPIRATION RATE: 16 BRPM | HEIGHT: 71 IN | DIASTOLIC BLOOD PRESSURE: 90 MMHG | SYSTOLIC BLOOD PRESSURE: 154 MMHG | HEART RATE: 80 BPM | BODY MASS INDEX: 44.1 KG/M2

## 2024-06-18 VITALS — DIASTOLIC BLOOD PRESSURE: 79 MMHG | SYSTOLIC BLOOD PRESSURE: 134 MMHG

## 2024-06-18 DIAGNOSIS — I63.9 CEREBRAL INFARCTION, UNSPECIFIED: ICD-10-CM

## 2024-06-18 PROCEDURE — 99205 OFFICE O/P NEW HI 60 MIN: CPT

## 2024-06-18 PROCEDURE — 99215 OFFICE O/P EST HI 40 MIN: CPT

## 2024-06-18 PROCEDURE — G2211 COMPLEX E/M VISIT ADD ON: CPT | Mod: NC

## 2024-06-18 PROCEDURE — 93000 ELECTROCARDIOGRAM COMPLETE: CPT

## 2024-07-10 ENCOUNTER — EMERGENCY (EMERGENCY)
Facility: HOSPITAL | Age: 59
LOS: 0 days | Discharge: HOME | End: 2024-07-10
Attending: STUDENT IN AN ORGANIZED HEALTH CARE EDUCATION/TRAINING PROGRAM
Payer: COMMERCIAL

## 2024-07-10 VITALS
TEMPERATURE: 98 F | SYSTOLIC BLOOD PRESSURE: 178 MMHG | HEART RATE: 71 BPM | RESPIRATION RATE: 17 BRPM | DIASTOLIC BLOOD PRESSURE: 106 MMHG | OXYGEN SATURATION: 100 %

## 2024-07-10 VITALS
HEIGHT: 71 IN | TEMPERATURE: 98 F | HEART RATE: 90 BPM | WEIGHT: 315 LBS | SYSTOLIC BLOOD PRESSURE: 140 MMHG | DIASTOLIC BLOOD PRESSURE: 74 MMHG | RESPIRATION RATE: 16 BRPM | OXYGEN SATURATION: 97 %

## 2024-07-10 DIAGNOSIS — R79.89 OTHER SPECIFIED ABNORMAL FINDINGS OF BLOOD CHEMISTRY: ICD-10-CM

## 2024-07-10 DIAGNOSIS — R07.89 OTHER CHEST PAIN: ICD-10-CM

## 2024-07-10 DIAGNOSIS — I45.2 BIFASCICULAR BLOCK: ICD-10-CM

## 2024-07-10 DIAGNOSIS — I10 ESSENTIAL (PRIMARY) HYPERTENSION: ICD-10-CM

## 2024-07-10 LAB
ALBUMIN SERPL ELPH-MCNC: 4 G/DL — SIGNIFICANT CHANGE UP (ref 3.3–5)
ALP SERPL-CCNC: 106 U/L — SIGNIFICANT CHANGE UP (ref 40–120)
ALT FLD-CCNC: 32 U/L — SIGNIFICANT CHANGE UP (ref 12–78)
ANION GAP SERPL CALC-SCNC: 7 MMOL/L — SIGNIFICANT CHANGE UP (ref 5–17)
APTT BLD: 33 SEC — SIGNIFICANT CHANGE UP (ref 24.5–35.6)
AST SERPL-CCNC: 29 U/L — SIGNIFICANT CHANGE UP (ref 15–37)
BASOPHILS # BLD AUTO: 0.07 K/UL — SIGNIFICANT CHANGE UP (ref 0–0.2)
BASOPHILS NFR BLD AUTO: 0.8 % — SIGNIFICANT CHANGE UP (ref 0–2)
BILIRUB SERPL-MCNC: 0.7 MG/DL — SIGNIFICANT CHANGE UP (ref 0.2–1.2)
BUN SERPL-MCNC: 20 MG/DL — SIGNIFICANT CHANGE UP (ref 7–23)
CALCIUM SERPL-MCNC: 9.6 MG/DL — SIGNIFICANT CHANGE UP (ref 8.5–10.1)
CHLORIDE SERPL-SCNC: 110 MMOL/L — HIGH (ref 96–108)
CO2 SERPL-SCNC: 24 MMOL/L — SIGNIFICANT CHANGE UP (ref 22–31)
CREAT SERPL-MCNC: 1.44 MG/DL — HIGH (ref 0.5–1.3)
EGFR: 56 ML/MIN/1.73M2 — LOW
EOSINOPHIL # BLD AUTO: 0.12 K/UL — SIGNIFICANT CHANGE UP (ref 0–0.5)
EOSINOPHIL NFR BLD AUTO: 1.4 % — SIGNIFICANT CHANGE UP (ref 0–6)
GLUCOSE SERPL-MCNC: 129 MG/DL — HIGH (ref 70–99)
HCT VFR BLD CALC: 43.3 % — SIGNIFICANT CHANGE UP (ref 39–50)
HGB BLD-MCNC: 14.1 G/DL — SIGNIFICANT CHANGE UP (ref 13–17)
IMM GRANULOCYTES NFR BLD AUTO: 0.2 % — SIGNIFICANT CHANGE UP (ref 0–0.9)
INR BLD: 0.97 RATIO — SIGNIFICANT CHANGE UP (ref 0.85–1.18)
LYMPHOCYTES # BLD AUTO: 1.23 K/UL — SIGNIFICANT CHANGE UP (ref 1–3.3)
LYMPHOCYTES # BLD AUTO: 13.9 % — SIGNIFICANT CHANGE UP (ref 13–44)
MAGNESIUM SERPL-MCNC: 2.1 MG/DL — SIGNIFICANT CHANGE UP (ref 1.6–2.6)
MCHC RBC-ENTMCNC: 28.6 PG — SIGNIFICANT CHANGE UP (ref 27–34)
MCHC RBC-ENTMCNC: 32.6 GM/DL — SIGNIFICANT CHANGE UP (ref 32–36)
MCV RBC AUTO: 87.8 FL — SIGNIFICANT CHANGE UP (ref 80–100)
MONOCYTES # BLD AUTO: 0.75 K/UL — SIGNIFICANT CHANGE UP (ref 0–0.9)
MONOCYTES NFR BLD AUTO: 8.5 % — SIGNIFICANT CHANGE UP (ref 2–14)
NEUTROPHILS # BLD AUTO: 6.67 K/UL — SIGNIFICANT CHANGE UP (ref 1.8–7.4)
NEUTROPHILS NFR BLD AUTO: 75.2 % — SIGNIFICANT CHANGE UP (ref 43–77)
NT-PROBNP SERPL-SCNC: 143 PG/ML — HIGH (ref 0–125)
PLATELET # BLD AUTO: 234 K/UL — SIGNIFICANT CHANGE UP (ref 150–400)
POTASSIUM SERPL-MCNC: 4 MMOL/L — SIGNIFICANT CHANGE UP (ref 3.5–5.3)
POTASSIUM SERPL-SCNC: 4 MMOL/L — SIGNIFICANT CHANGE UP (ref 3.5–5.3)
PROT SERPL-MCNC: 7.9 GM/DL — SIGNIFICANT CHANGE UP (ref 6–8.3)
PROTHROM AB SERPL-ACNC: 11 SEC — SIGNIFICANT CHANGE UP (ref 9.5–13)
RBC # BLD: 4.93 M/UL — SIGNIFICANT CHANGE UP (ref 4.2–5.8)
RBC # FLD: 14 % — SIGNIFICANT CHANGE UP (ref 10.3–14.5)
SODIUM SERPL-SCNC: 141 MMOL/L — SIGNIFICANT CHANGE UP (ref 135–145)
TROPONIN I, HIGH SENSITIVITY RESULT: 14.34 NG/L — SIGNIFICANT CHANGE UP
WBC # BLD: 8.86 K/UL — SIGNIFICANT CHANGE UP (ref 3.8–10.5)
WBC # FLD AUTO: 8.86 K/UL — SIGNIFICANT CHANGE UP (ref 3.8–10.5)

## 2024-07-10 PROCEDURE — 93010 ELECTROCARDIOGRAM REPORT: CPT

## 2024-07-10 PROCEDURE — 80053 COMPREHEN METABOLIC PANEL: CPT

## 2024-07-10 PROCEDURE — 71046 X-RAY EXAM CHEST 2 VIEWS: CPT

## 2024-07-10 PROCEDURE — 99285 EMERGENCY DEPT VISIT HI MDM: CPT

## 2024-07-10 PROCEDURE — 93005 ELECTROCARDIOGRAM TRACING: CPT

## 2024-07-10 PROCEDURE — 99285 EMERGENCY DEPT VISIT HI MDM: CPT | Mod: 25

## 2024-07-10 PROCEDURE — 85610 PROTHROMBIN TIME: CPT

## 2024-07-10 PROCEDURE — 84484 ASSAY OF TROPONIN QUANT: CPT

## 2024-07-10 PROCEDURE — 36000 PLACE NEEDLE IN VEIN: CPT

## 2024-07-10 PROCEDURE — 85025 COMPLETE CBC W/AUTO DIFF WBC: CPT

## 2024-07-10 PROCEDURE — 83735 ASSAY OF MAGNESIUM: CPT

## 2024-07-10 PROCEDURE — 71046 X-RAY EXAM CHEST 2 VIEWS: CPT | Mod: 26

## 2024-07-10 PROCEDURE — 36415 COLL VENOUS BLD VENIPUNCTURE: CPT

## 2024-07-10 PROCEDURE — 83880 ASSAY OF NATRIURETIC PEPTIDE: CPT

## 2024-07-10 PROCEDURE — 85730 THROMBOPLASTIN TIME PARTIAL: CPT

## 2024-07-10 RX ORDER — ASPIRIN 325 MG/1
243 TABLET, FILM COATED ORAL ONCE
Refills: 0 | Status: COMPLETED | OUTPATIENT
Start: 2024-07-10 | End: 2024-07-10

## 2024-07-10 RX ADMIN — ASPIRIN 243 MILLIGRAM(S): 325 TABLET, FILM COATED ORAL at 17:42

## 2024-07-26 ENCOUNTER — OFFICE (OUTPATIENT)
Dept: URBAN - METROPOLITAN AREA CLINIC 88 | Facility: CLINIC | Age: 59
Setting detail: OPHTHALMOLOGY
End: 2024-07-26
Payer: COMMERCIAL

## 2024-07-26 DIAGNOSIS — G51.0: ICD-10-CM

## 2024-07-26 DIAGNOSIS — H35.62: ICD-10-CM

## 2024-07-26 DIAGNOSIS — H35.033: ICD-10-CM

## 2024-07-26 DIAGNOSIS — D31.32: ICD-10-CM

## 2024-07-26 PROCEDURE — 92134 CPTRZ OPH DX IMG PST SGM RTA: CPT | Performed by: OPHTHALMOLOGY

## 2024-07-26 PROCEDURE — 92014 COMPRE OPH EXAM EST PT 1/>: CPT | Performed by: OPHTHALMOLOGY

## 2024-07-26 ASSESSMENT — CONFRONTATIONAL VISUAL FIELD TEST (CVF)
OS_FINDINGS: FULL
OD_FINDINGS: FULL

## 2024-09-27 ENCOUNTER — OFFICE (OUTPATIENT)
Dept: URBAN - METROPOLITAN AREA CLINIC 88 | Facility: CLINIC | Age: 59
Setting detail: OPHTHALMOLOGY
End: 2024-09-27
Payer: COMMERCIAL

## 2024-09-27 DIAGNOSIS — G51.0: ICD-10-CM

## 2024-09-27 DIAGNOSIS — H35.033: ICD-10-CM

## 2024-09-27 DIAGNOSIS — D31.32: ICD-10-CM

## 2024-09-27 PROCEDURE — 92134 CPTRZ OPH DX IMG PST SGM RTA: CPT | Performed by: OPHTHALMOLOGY

## 2024-09-27 PROCEDURE — 92012 INTRM OPH EXAM EST PATIENT: CPT | Performed by: OPHTHALMOLOGY

## 2024-09-27 PROCEDURE — 92235 FLUORESCEIN ANGRPH MLTIFRAME: CPT | Performed by: OPHTHALMOLOGY

## 2024-09-27 ASSESSMENT — CONFRONTATIONAL VISUAL FIELD TEST (CVF)
OS_FINDINGS: FULL
OD_FINDINGS: FULL

## 2025-02-06 RX ORDER — LABETALOL HYDROCHLORIDE 300 MG/1
1 TABLET, FILM COATED ORAL
Refills: 0 | DISCHARGE

## 2025-02-06 RX ORDER — ROSUVASTATIN CALCIUM 10 MG/1
1 TABLET, FILM COATED ORAL
Refills: 0 | DISCHARGE

## 2025-02-06 NOTE — ASU PATIENT PROFILE, ADULT - FALL HARM RISK - UNIVERSAL INTERVENTIONS
Bed in lowest position, wheels locked, appropriate side rails in place/Call bell, personal items and telephone in reach/Instruct patient to call for assistance before getting out of bed or chair/Non-slip footwear when patient is out of bed/[place] to call system/Physically safe environment - no spills, clutter or unnecessary equipment/Purposeful Proactive Rounding/Room/bathroom lighting operational, light cord in reach

## 2025-02-07 ENCOUNTER — TRANSCRIPTION ENCOUNTER (OUTPATIENT)
Age: 60
End: 2025-02-07

## 2025-02-07 ENCOUNTER — OUTPATIENT (OUTPATIENT)
Dept: OUTPATIENT SERVICES | Facility: HOSPITAL | Age: 60
LOS: 1 days | Discharge: HOME | End: 2025-02-07
Payer: COMMERCIAL

## 2025-02-07 VITALS
RESPIRATION RATE: 18 BRPM | HEART RATE: 65 BPM | SYSTOLIC BLOOD PRESSURE: 149 MMHG | OXYGEN SATURATION: 99 % | DIASTOLIC BLOOD PRESSURE: 81 MMHG

## 2025-02-07 VITALS
DIASTOLIC BLOOD PRESSURE: 89 MMHG | TEMPERATURE: 98 F | RESPIRATION RATE: 18 BRPM | HEIGHT: 71 IN | SYSTOLIC BLOOD PRESSURE: 157 MMHG | WEIGHT: 315 LBS | HEART RATE: 58 BPM | OXYGEN SATURATION: 100 %

## 2025-02-07 DIAGNOSIS — R94.39 ABNORMAL RESULT OF OTHER CARDIOVASCULAR FUNCTION STUDY: ICD-10-CM

## 2025-02-07 LAB
ANION GAP SERPL CALC-SCNC: 2 MMOL/L — LOW (ref 5–17)
BUN SERPL-MCNC: 14 MG/DL — SIGNIFICANT CHANGE UP (ref 7–23)
CALCIUM SERPL-MCNC: 8.9 MG/DL — SIGNIFICANT CHANGE UP (ref 8.5–10.1)
CHLORIDE SERPL-SCNC: 107 MMOL/L — SIGNIFICANT CHANGE UP (ref 96–108)
CO2 SERPL-SCNC: 26 MMOL/L — SIGNIFICANT CHANGE UP (ref 22–31)
CREAT SERPL-MCNC: 1.3 MG/DL — SIGNIFICANT CHANGE UP (ref 0.5–1.3)
EGFR: 62 ML/MIN/1.73M2 — SIGNIFICANT CHANGE UP
GLUCOSE SERPL-MCNC: 106 MG/DL — HIGH (ref 70–99)
POTASSIUM SERPL-MCNC: 4.3 MMOL/L — SIGNIFICANT CHANGE UP (ref 3.5–5.3)
POTASSIUM SERPL-SCNC: 4.3 MMOL/L — SIGNIFICANT CHANGE UP (ref 3.5–5.3)
SODIUM SERPL-SCNC: 135 MMOL/L — SIGNIFICANT CHANGE UP (ref 135–145)

## 2025-02-07 PROCEDURE — C1769: CPT

## 2025-02-07 PROCEDURE — 93454 CORONARY ARTERY ANGIO S&I: CPT

## 2025-02-07 PROCEDURE — 36415 COLL VENOUS BLD VENIPUNCTURE: CPT

## 2025-02-07 PROCEDURE — C1894: CPT

## 2025-02-07 PROCEDURE — 80048 BASIC METABOLIC PNL TOTAL CA: CPT

## 2025-02-07 PROCEDURE — C1887: CPT

## 2025-02-07 RX ORDER — BACTERIOSTATIC SODIUM CHLORIDE 0.9 %
250 VIAL (ML) INJECTION ONCE
Refills: 0 | Status: COMPLETED | OUTPATIENT
Start: 2025-02-07 | End: 2025-02-07

## 2025-02-07 RX ORDER — BACTERIOSTATIC SODIUM CHLORIDE 0.9 %
1000 VIAL (ML) INJECTION
Refills: 0 | Status: DISCONTINUED | OUTPATIENT
Start: 2025-02-07 | End: 2025-02-07

## 2025-02-07 RX ADMIN — Medication 250 MILLILITER(S): at 09:14

## 2025-02-07 NOTE — PACU DISCHARGE NOTE - COMMENTS
Patient s/p LHC via Right Radial Artery. Gauze and tegaderm to RUE. No s/s of bleeding or hematoma. RUE warm and mobile. VS Stable. Patient denies pain. Discharge instructions reviewed with patient and patient verbalizes understanding. SL removed. Patient pending transport to the lobby to meet his spouse for transport home

## 2025-02-07 NOTE — H&P ADULT - PROBLEM SELECTOR PLAN 1
MANGO prehydration NS 250cc bolus x 1    LHC    -Consent obtained by interventional cardiologist for cardiac catheterization w/ coronary angiogram, possible sedation and/or analgesia and possible stent placement. Pt is competent, has capacity, and understands risks and benefits of procedure. Risks and benefits discussed. Risk discussed included, but not limited to MI, stroke, mortality, major bleeding, arrythmia, or infection. All questions answered

## 2025-02-07 NOTE — H&P ADULT - HISTORY OF PRESENT ILLNESS
Labs needed  
58 y/o morbidly obese male, PMH [place] palsy, HLD, HTN, CVA presented to cardiology to establish care. Cardiac PET reveals abnormal myocardial perfusion  with anterior ischemia. Pt referred to cardiac cath for ischemic evaluation.

## 2025-02-07 NOTE — H&P ADULT - NSHPPHYSICALEXAM_GEN_ALL_CORE
PHYSICAL EXAM  GENERAL: NAD, AAOx3, morbidly obese  HEAD:  Atraumatic, Normocephalic  EYES: EOMI, PERRLA, conjunctiva and sclera clear  NECK: Supple, No JVD, No LAD  CHEST/LUNG: Clear to auscultation bilaterally; No wheeze  HEART: s1 s2 Regular rate and rhythm; No murmurs, rubs, or gallops  ABDOMEN: Soft, Nontender, Nondistended; Bowel sounds present X 4 quadrants   EXTREMITIES:  2+ Peripheral Pulses, No clubbing, cyanosis, or edema  SKIN: No rashes or lesions,  b/l LE not red, cool to touch,  no open skin no drainage  NEURO: nonfocal CN/motor/sensory/reflexes  Psych: normal affect and behavior, calm and cooperative

## 2025-02-07 NOTE — H&P ADULT - ASSESSMENT
60 y/o morbidly obese male, PMH [place] palsy, HLD, HTN, CVA presented to cardiology to establish care. Cardiac PET reveals abnormal myocardial perfusion  with anterior ischemia. Pt referred to cardiac cath for ischemic evaluation.       ASA class: II  Creatinine: 1.33  GFR: 61  Bleeding  Risk score:  Haroldo Score:  58 y/o morbidly obese male, PMH [place] palsy, HLD, HTN, CVA presented to cardiology to establish care. Cardiac PET reveals abnormal myocardial perfusion  with anterior ischemia. Pt referred to cardiac cath for ischemic evaluation.       ASA class: II  Creatinine: 1.30  GFR: 61  Bleeding  Risk score: 1.0%  Haroldo Score:  1pt

## 2025-02-07 NOTE — PROGRESS NOTE ADULT - SUBJECTIVE AND OBJECTIVE BOX
Nurse Practitioner Progress note:     HPI:  58 y/o morbidly obese male, PMH [place] palsy, HLD, HTN, CVA presented to cardiology to establish care. Cardiac PET reveals abnormal myocardial perfusion  with anterior ischemia. Pt referred to cardiac cath for ischemic evaluation.  (07 Feb 2025 09:18)  S/P LHC  non obstructive     T(C): 36.7 (02-07-25 @ 08:49), Max: 36.7 (02-07-25 @ 08:49)  HR: 58 (02-07-25 @ 08:49) (58 - 58)  BP: 157/89 (02-07-25 @ 08:49) (157/89 - 157/89)  RR: 18 (02-07-25 @ 08:49) (18 - 18)  SpO2: 100% (02-07-25 @ 08:49) (100% - 100%)  Wt(kg):       PHYSICAL EXAM:  GENERAL: A&Ox3 , NAD   CHEST/LUNG: Clear to auscultation bilaterally; No wheeze  HEART: s1 s2 Regular rate and rhythm; No murmurs, rubs, or gallops  ABDOMEN: Soft, Nontender, Nondistended; Bowel sounds present X 4 quadrants   EXTREMITIES:  2+ Peripheral Pulses, No clubbing, cyanosis, or edema   VASCULAR: Peripheral pulses palpable 2+ bilaterally  PROCEDURE SITE: Right radial band to be removed one hour post placement,Site is without hematoma or bleeding. Sensation and KENY intact. Distal pulses palpable 2+, capillary refill < 2 seconds. Patient denies pain, numbness, tingling, CP or SOB. Clean dry dressing applied     PROCEDURE RESULTS: full report to follow     ASSESSMENT: HPI:  58 y/o morbidly obese male, PMH [place] palsy, HLD, HTN, CVA presented to cardiology to establish care. Cardiac PET reveals abnormal myocardial perfusion  with anterior ischemia. Pt referred to cardiac cath for ischemic evaluation.  (07 Feb 2025 09:18)        PLAN:  -VS, diet, activity as per post cath orders  -MANGO post hydration protocol  -Continue current medications  -Plan of care discussed with patient, spouse, and Dr Khan   -Post cath instructions reviewed, patient verbalizes and understands instructions  - Patient to be discharged home, recommend following up with cardiologist in 2 weeks

## 2025-02-07 NOTE — ASU DISCHARGE PLAN (ADULT/PEDIATRIC) - CARE PROVIDER_API CALL
Julia Ni  Cardiovascular Disease  [address] [place] [address]  Phone: [phone]  Fax: [phone]  Follow Up Time:

## 2025-02-07 NOTE — BRIEF OPERATIVE NOTE - NSICDXBRIEFPOSTOP_GEN_ALL_CORE_FT
POST-OP DIAGNOSIS:  Nonocclusive coronary atherosclerosis of native coronary artery 07-Feb-2025 10:36:11  Jil Woods L

## 2025-02-07 NOTE — H&P ADULT - NSHPLABSRESULTS_GEN_ALL_CORE
1/23/25 EKG  SB, RBBB    1/21/25 Cardiac PET   abnormal myocardial perfusion consistent with anterior ischemia    4/30/24 TTE  60-65%  Moderate LVH

## 2025-02-07 NOTE — H&P ADULT - NSICDXPASTMEDICALHX_GEN_ALL_CORE_FT
PAST MEDICAL HISTORY:  Bell's palsy     CVA (cerebrovascular accident)     HLD (hyperlipidemia)     HTN (hypertension)     Morbid obesity

## 2025-02-07 NOTE — ASU DISCHARGE PLAN (ADULT/PEDIATRIC) - FINANCIAL ASSISTANCE
[facility] provides services at a reduced cost to those who are determined to be eligible through [facility]’s financial assistance program. Information regarding [facility]’s financial assistance program can be found by going to [email or web address] or by calling [phone].

## 2025-02-12 DIAGNOSIS — I25.10 ATHEROSCLEROTIC HEART DISEASE OF NATIVE CORONARY ARTERY WITHOUT ANGINA PECTORIS: ICD-10-CM
